# Patient Record
Sex: FEMALE | Race: WHITE | NOT HISPANIC OR LATINO | Employment: FULL TIME | URBAN - NONMETROPOLITAN AREA
[De-identification: names, ages, dates, MRNs, and addresses within clinical notes are randomized per-mention and may not be internally consistent; named-entity substitution may affect disease eponyms.]

---

## 2017-01-12 ENCOUNTER — HOSPITAL ENCOUNTER (EMERGENCY)
Facility: HOSPITAL | Age: 52
Discharge: HOME | End: 2017-01-12
Attending: NURSE PRACTITIONER | Admitting: NURSE PRACTITIONER
Payer: OTHER MISCELLANEOUS

## 2017-01-12 VITALS
RESPIRATION RATE: 20 BRPM | DIASTOLIC BLOOD PRESSURE: 92 MMHG | HEART RATE: 81 BPM | TEMPERATURE: 97.8 F | SYSTOLIC BLOOD PRESSURE: 151 MMHG | OXYGEN SATURATION: 99 %

## 2017-01-12 DIAGNOSIS — M79.645 PAIN OF FINGER OF LEFT HAND: ICD-10-CM

## 2017-01-12 PROCEDURE — 99213 OFFICE O/P EST LOW 20 MIN: CPT

## 2017-01-12 PROCEDURE — 73140 X-RAY EXAM OF FINGER(S): CPT | Mod: TC,LT

## 2017-01-12 PROCEDURE — 99213 OFFICE O/P EST LOW 20 MIN: CPT | Performed by: NURSE PRACTITIONER

## 2017-01-12 ASSESSMENT — ENCOUNTER SYMPTOMS
CHILLS: 0
NAUSEA: 0
FEVER: 0
ACTIVITY CHANGE: 1
FATIGUE: 0
PSYCHIATRIC NEGATIVE: 1
APPETITE CHANGE: 0
DYSURIA: 0
VOMITING: 0

## 2017-01-12 NOTE — ED NOTES
Patient came in with left thumb pain from work related injury by picking up binders. Patient works at [employer]. Patient noted pain & symptoms started on 9/26/16. Pain ranges from excruciating to soreness depending on hand activity.

## 2017-01-12 NOTE — ED AVS SNAPSHOT
[facility]    [address] [address]    Phone:  [phone]                                       Lakeisha Herman   MRN: [id number]    Department:  [facility]   Date of Visit:  1/12/2017           After Visit Summary Signature Page     I have received my discharge instructions, and my questions have been answered. I have discussed any challenges I see with this plan with the nurse or doctor.    ..........................................................................................................................................  Patient/Patient Representative Signature      ..........................................................................................................................................  Patient Representative Print Name and Relationship to Patient    ..................................................               ................................................  Date                                            Time    ..........................................................................................................................................  Reviewed by Signature/Title    ...................................................              ..............................................  Date                                                            Time

## 2017-01-12 NOTE — ED AVS SNAPSHOT
[facility]    [address]    [facility] [address]    Phone:  [phone]                                       Lakeisha Herman   MRN: [id number]    Department:  [facility]   Date of Visit:  1/12/2017           Patient Information     Date Of Birth          1965        Your diagnoses for this visit were:     Pain of finger of left hand        You were seen by Claudia La NP.      Follow-up Information     Follow up with Conchis Elena    Specialty:  Family Practice    Why:  As needed, If symptoms worsen    Contact information:    [facility]  [address] [address]  [phone]          Follow up with [facility].    Specialty:  EMERGENCY MEDICINE    Why:  As needed, If symptoms worsen    Contact information:    [address]  [facility] [address] [phone]    Additional information:    From [place]: Take [address]. Turn left at [address]. Turn left at the first stoplight on [address]. At the first stop sign, take a right onto [address]. Take a left into the parking lot and continue through until you reach the North enterance of the building.       From [place]: Take [address]. Take the [address] ramp towards [place]. Turn left onto [address]. Take a slight right onto [address]. Turn left at the first stoplight on [address]. At the first stop sign, take a right onto [address]. Take a left into the parking lot and continue through until you reach the North enterance of the building.       From [place]: Take [address] South. Take a right at [address]. At the first stop sign, take a right onto [address]. Take a left into the parking lot and continue through until you reach the North enterance of the building.         Discharge Instructions       Take tylenol and or ibuprofen for pain control.  Wear splint when  "your using your hands.   You can use heat and or ice to left thumb. Protect skin from frost and burn.   Follow up with PCP with any increase in symptoms or concerns.   Return to urgent care or emergency department with any increase in symptoms or concerns.     Discharge References/Attachments     STRAINS AND SPRAINS, SELF-CARE FOR (ENGLISH)    STRAINS AND SPRAINS, TREATING (ENGLISH)    R.I.C.E. (ENGLISH)         Review of your medicines      Notice     You have not been prescribed any medications.            Procedures and tests performed during your visit     Fingers XR, 2-3 views, left      Orders Needing Specimen Collection     None      Pending Results     Date and Time Order Name Status Description    2017 1754 Fingers XR, 2-3 views, left In process             Pending Culture Results     No orders found from 2017 to 2017.            Thank you for choosing [facility]       Thank you for choosing [facility] for your care. Our goal is always to provide you with excellent care. Hearing back from our patients is one way we can continue to improve our services. Please take a few minutes to complete the written survey that you may receive in the mail after you visit with us. Thank you!        [employer] Information     [employer] lets you send messages to your doctor, view your test results, renew your prescriptions, schedule appointments and more. To sign up, go to [email or web address] . Click on \"Log in\" on the left side of the screen, which will take you to the Welcome page. Then click on \"Sign up Now\" on the right side of the page.     You will be asked to enter the access code listed below, as well as some personal information. Please follow the directions to create your username and password.     Your access code is: [email or web address]  Expires: 2017  6:23 PM     Your access code will  in 90 days. If you need help or a new code, please call your [facility] clinic or [phone].        Care " EveryWhere ID     This is your Care EveryWhere ID. This could be used by other organizations to access your [facility] medical records  [id number]        After Visit Summary       This is your record. Keep this with you and show to your community pharmacist(s) and doctor(s) at your next visit.

## 2017-01-12 NOTE — ED PROVIDER NOTES
History     Chief Complaint   Patient presents with     Hand Pain     The history is provided by the patient. No  was used.     Lakeisha Herman is a 51 year old female who presents with left thumb pain. No injury or trauma to left thumb. Pain started with lifting binders on 9/26/16 at her job. She works in a business office. She has taken tylenol and ibuprofen with mild relief. Denies numbness or tingling in hand. Denies fever, chills, or night sweats. Eating and drinking well. Bowel and bladder are working well.     I have reviewed the Medications, Allergies, Past Medical and Surgical History, and Social History in the Epic system.    Review of Systems   Constitutional: Positive for activity change. Negative for fever, chills, appetite change and fatigue.   Gastrointestinal: Negative for nausea and vomiting.   Genitourinary: Negative for dysuria.   Musculoskeletal:        Left thumb pain.    Skin: Negative for rash.   Neurological: Negative for numbness.   Psychiatric/Behavioral: Negative.        Physical Exam   BP: 151/92 mmHg  Pulse: 81  Temp: 97.8  F (36.6  C)  Resp: 20  SpO2: 99 %  Physical Exam   Constitutional: She is oriented to person, place, and time. She appears well-developed and well-nourished. No distress.   HENT:   Mouth/Throat: Oropharynx is clear and moist.   Cardiovascular: Normal rate, regular rhythm, normal heart sounds and intact distal pulses.    Pulmonary/Chest: Effort normal. No respiratory distress. She has no wheezes. She has no rales.   Abdominal: Soft.   Musculoskeletal: She exhibits tenderness. She exhibits no edema.   ROM and CMS intact to left thumb. Left radial pulse +2. Negative Tinel and Phalen sign. No redness or increase in warmth to the touch to left thumb.    Neurological: She is alert and oriented to person, place, and time.   Skin: Skin is warm and dry. No rash noted. She is not diaphoretic. No erythema.   Psychiatric: She has a normal mood and affect. Her  behavior is normal.   Nursing note and vitals reviewed.      ED Course   Procedures  I personally reviewed the x-rays and there is NO fracture or dislocation. Radiology review pending and nurse will notify patient if there is any change in the treatment plan.    Aluminium padded splint secured to left thumb. She will wear splint when using left hand.     Assessments & Plan (with Medical Decision Making)     I have reviewed the nursing notes.    I have reviewed the findings, diagnosis, plan and need for follow up with the patient.  Discharged in stable condition.     New Prescriptions    No medications on file       Final diagnoses:   Pain of finger of left hand     Take tylenol and or ibuprofen for pain control.  Wear splint when your using your thumb.   You can use heat and or ice to left thumb. Protect skin from frost and burn.   Follow up with PCP with any increase in symptoms or concerns.   Return to urgent care or emergency department with any increase in symptoms or concerns.     LANE Weber  1/12/2017  5:59 PM  URGENT CARE CLINIC          Claudia La NP  01/16/17 0928

## 2017-01-13 ENCOUNTER — HOSPITAL ENCOUNTER (EMERGENCY)
Facility: HOSPITAL | Age: 52
Discharge: HOME | End: 2017-01-13
Attending: NURSE PRACTITIONER | Admitting: NURSE PRACTITIONER
Payer: OTHER MISCELLANEOUS

## 2017-01-13 VITALS
DIASTOLIC BLOOD PRESSURE: 96 MMHG | HEART RATE: 79 BPM | TEMPERATURE: 98.4 F | OXYGEN SATURATION: 99 % | SYSTOLIC BLOOD PRESSURE: 146 MMHG | RESPIRATION RATE: 16 BRPM
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
## 2017-01-13 PROCEDURE — 99213 OFFICE O/P EST LOW 20 MIN: CPT | Performed by: NURSE PRACTITIONER

## 2017-01-13 PROCEDURE — 99212 OFFICE O/P EST SF 10 MIN: CPT

## 2017-01-13 ASSESSMENT — ENCOUNTER SYMPTOMS: CONSTITUTIONAL NEGATIVE: 1

## 2017-01-13 NOTE — ED AVS SNAPSHOT
[facility]    [address] [address]    Phone:  [phone]                                       Lakeisha Herman   MRN: [id number]    Department[facility]  [facility]   Date of Visit:  1/13/2017           Patient Information     Date Of Birth          1965        Your diagnoses for this visit were:     Overuse syndrome Left Thumb       You were seen by Justa Diaz, NP.      Follow-up Information     Follow up with Conchis Elena    Specialty:  Family Practice    Why:  for re-evaluation and ongoing management     Contact information:    [facility]  [address] [address]  [phone]        Discharge References/Attachments     MSDS, REDUCING RISKS: YOUR ROLE (ENGLISH)      ED Discharge Orders     PHYSICAL THERAPY REFERRAL       *This therapy referral will be filtered to a centralized scheduling office at [facility] and the patient will receive a call to schedule an appointment at a [place] location most convenient for them. *     [facility] provides Physical Therapy evaluation and treatment and many specialty services across the [place] system.  If requesting a specialty program, please choose from the list below.    If you have not heard from the scheduling office within 2 business days, please call [phone] for all locations, with the exception of [place], please call [phone].  Treatment: Evaluation & Treatment   Please be aware that coverage of these services is subject to the terms and limitations of your health insurance plan.  Call member services at your health plan with any benefit or coverage questions.      **Note to Provider:  If you are referring outside of [place] for the therapy appointment, please list the name of the location in the  special instructions  above, print the referral and give to the patient to schedule the appointment.                     Review  "of your medicines      Notice     You have not been prescribed any medications.            Orders Needing Specimen Collection     None      Pending Results     No orders found from 2017 to 2017.            Pending Culture Results     No orders found from 2017 to 2017.            Thank you for choosing [facility]       Thank you for choosing [facility] for your care. Our goal is always to provide you with excellent care. Hearing back from our patients is one way we can continue to improve our services. Please take a few minutes to complete the written survey that you may receive in the mail after you visit with us. Thank you!        [employer] Information     [employer] lets you send messages to your doctor, view your test results, renew your prescriptions, schedule appointments and more. To sign up, go to [email or web address] . Click on \"Log in\" on the left side of the screen, which will take you to the Welcome page. Then click on \"Sign up Now\" on the right side of the page.     You will be asked to enter the access code listed below, as well as some personal information. Please follow the directions to create your username and password.     Your access code is: [email or web address]  Expires: 2017  6:23 PM     Your access code will  in 90 days. If you need help or a new code, please call your [facility] clinic or [phone].        Care EveryWhere ID     This is your Care EveryWhere ID. This could be used by other organizations to access your [facility] medical records  [id number]        After Visit Summary       This is your record. Keep this with you and show to your community pharmacist(s) and doctor(s) at your next visit.                  "

## 2017-01-13 NOTE — PROGRESS NOTES
Quick Note:    Left First Digit XR report faxed to PCP, Dr. Conchis Elena at [facility]. Impression - Minimal degenerative changed at the first carpometacarpal joint are noted. Pt advised to follow up.  ______

## 2017-01-13 NOTE — ED NOTES
Pt presents with c/o thumb pain. Pt was seen here last night and wants a new brace/splint for he thumb.

## 2017-01-13 NOTE — ED NOTES
Patient came in with left thumb discomfort. Patient came in yesterday & splint was applied. But patient says its too bulky to work with. Pain 7/10 when using.

## 2017-01-13 NOTE — ED AVS SNAPSHOT
[facility]    [address] [address]    Phone:  [phone]                                       Lakeisha Herman   MRN: [id number]    Department[facility]  [facility]   Date of Visit:  1/13/2017           After Visit Summary Signature Page     I have received my discharge instructions, and my questions have been answered. I have discussed any challenges I see with this plan with the nurse or doctor.    ..........................................................................................................................................  Patient/Patient Representative Signature      ..........................................................................................................................................  Patient Representative Print Name and Relationship to Patient    ..................................................               ................................................  Date                                            Time    ..........................................................................................................................................  Reviewed by Signature/Title    ...................................................              ..............................................  Date                                                            Time

## 2017-01-16 ASSESSMENT — ENCOUNTER SYMPTOMS: NUMBNESS: 0

## 2017-01-16 NOTE — DISCHARGE INSTRUCTIONS
Take tylenol and or ibuprofen for pain control.  Wear splint when your using your thumb.   You can use heat and or ice to left thumb. Protect skin from frost and burn.   Follow up with PCP with any increase in symptoms or concerns.   Return to urgent care or emergency department with any increase in symptoms or concerns.

## 2017-01-23 ENCOUNTER — HOSPITAL ENCOUNTER (OUTPATIENT)
Dept: OCCUPATIONAL THERAPY | Facility: HOSPITAL | Age: 52
Setting detail: THERAPIES SERIES
End: 2017-01-23
Attending: NURSE PRACTITIONER
Payer: OTHER MISCELLANEOUS
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
## 2017-01-23 PROCEDURE — 40000118 ZZH STATISTIC OT DEPT VISIT

## 2017-01-23 PROCEDURE — 97165 OT EVAL LOW COMPLEX 30 MIN: CPT | Mod: GO

## 2017-01-23 PROCEDURE — 97166 OT EVAL MOD COMPLEX 45 MIN: CPT | Mod: GO

## 2017-01-24 NOTE — PROGRESS NOTES
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  This past fall she began experiencing pain in the thenar eminance of her left thumb which she attributes to  lifting large binders repeateldly from a surface above shoulder height.  She has notice when she tried to  item such as a bunch of bananas at the groucer store, or a pan while preparing meals she will have sharp pain.  Lakeisha is , lives alone, frequently spends time with her grandson and enjoys working in her yard.   Previous treatment or current condition aluminum and foam finger splint which was bulky and did not allow her to do her work.   Past medical history n/a   How/Where did it occur With repetition/overuse   Onset date of current episode/exacerbation 09/24/16   Chronicity New   Hand Dominance Right   Affected side Left   Functional limitations perform required work activities;perform activities of daily living;perform desired leisure / sports activities   Reported Symptoms Pain;Loss of strength   QuickDASH [Functional Disability Questionnaire; 0-100 (0=no dysfunction; 100=dysfunction)] Open Dash   Open Jar 5   Heavy Household Chores 3   Carry a shopping bag 3  (not including thum)   Wash back 4   Cut food with knife 2   Recreational activities 2   Social activities 1   Work, daily activities 4   Pain 3   Tingling 1   Sleeping 2   QuickDASH Sum 30   QuickDASH Count 11   QuickDASH Disability/Symptom Score 43.18   Prior level of function Independent ADL;Independent IADL   Important Activities being outside, shoveling, work, playing with grandson, doing activities with son   Living environment [place]   Patient role/Employment history Employed  "  Occupation accounts payable and elementary enrollment    Employment Status Working in normal job without restrictions   Primary Job Tasks Keyboarding;Gripping/pinching;Pushing/pulling;Prolonged sitting;Repetitive tasks;Carrying   Occupation Comments Pt has modified her work environment somewhat in that she's decreased the size of the binders she has to lift frequently and put them lower to make them more accessible.   Patient/Family goals statement \"I'm hoping to get back to using hand without pain\"   Pain   Pain Primary Pain Report   Primary Pain Report   Location volar surface of thenar eminance   Radiation (radial forearm)   Pain Quality Sharp   Frequency Intermittent   Scale 4/10  (can shoot up to 6-7/10)   Pain Is Worse In The P.m.   Pain Is Exacerbated By Gripping;Pinching   Pain Is Relieved By Ice  (massage)   Progression Since Onset Unchanged  (provactive testing negative)   Edema   Edema Thumb;Distal Wrist Crease   Thumb (measured in cm)   P1 -  - Left 6.5   P1 -  - Right 6.6   Distal Wrist Crease (measured in cm)   Distal Wrist Crease - - Left 17.1   Distal Wrist Crease - - Right 16.5   ROM   ROM AROM   AROM   AROM Thumb   Thumb   MP Flexion - Left 58   MP Flexion - Right 58   IP Flexion - Left 64   IP Flexion - Right 68   RABD - Left 65   RABD - Right 83   PABD - Left 57   PABD - Right 91   Opposition to small left (cm) complete but painful   Opposition to small right (cm) complete   Strength   Strength Strength    Avg - Left 12    Avg - Right 55   Lateral Pinch - Left 10   Lateral Pinch - Right 14   3 Point Pinch - Left 6   3 Point Pinch - Right 12   Therapy Interventions   Planned Therapy Interventions Ultrasound;Paraffin;ROM;Strengthening;Splinting;Edema Management   Clinical Impression   Criteria for Skilled Therapeutic Interventions Met yes;treatment indicated   OT Diagnosis Over use   Influenced by the following impairments Pain;Decreased range of motion;Decreased strength "   Assessment of Occupational Performance 3-5 Performance Deficits   Identified Performance Deficits work, cooking, shopping, playing with her grandson, yardwork, gripping, pinching   Clinical Decision Making (Complexity) Moderate complexity   Therapy Frequency 2x/week   Predicted Duration of Therapy Intervention (days/wks) 4 weeks   Risks and Benefits of Treatment have been explained. Yes   Patient, Family & other staff in agreement with plan of care Yes   Hand Goals   Hand Goals Work;Household Chores;Sports/Recreation   Household Chores   Current Functional Task Cooking   Previous Performance Level Independent   Current Performance Level Moderate difficulty   Goal Target Task Hold and lift pan   Goal Target Performance Level Pain free   Due Date 02/20/17   Work   Current Functional Task Repetitive tasks   Previous Performance Level Independent   Current Performance Level Mild difficulty   Goal Target Task Complete repetitive tasks   Goal Target Performance Level Pain free   Due Date 02/20/17   Sports/Recreation   Current Functional Task Playing   Previous Performance Level Independent   Curent Performance Level Moderate difficulty   Goal Target Task (play with grandson)   Goal Target Performance Level Pain free   Due Date 02/20/17   Total Evaluation Time   Total Evaluation Time (Minutes) 34

## 2017-01-25 ENCOUNTER — HOSPITAL ENCOUNTER (OUTPATIENT)
Dept: OCCUPATIONAL THERAPY | Facility: HOSPITAL | Age: 52
Setting detail: THERAPIES SERIES
End: 2017-01-25
Attending: FAMILY MEDICINE
Payer: OTHER MISCELLANEOUS

## 2017-01-25 PROCEDURE — 97760 ORTHOTIC MGMT&TRAING 1ST ENC: CPT | Mod: GO

## 2017-01-25 PROCEDURE — 40000118 ZZH STATISTIC OT DEPT VISIT

## 2017-01-25 PROCEDURE — 97018 PARAFFIN BATH THERAPY: CPT | Mod: GO

## 2017-02-02 ENCOUNTER — HOSPITAL ENCOUNTER (OUTPATIENT)
Dept: OCCUPATIONAL THERAPY | Facility: HOSPITAL | Age: 52
Setting detail: THERAPIES SERIES
End: 2017-02-02
Attending: FAMILY MEDICINE
Payer: OTHER MISCELLANEOUS

## 2017-02-02 PROCEDURE — 97140 MANUAL THERAPY 1/> REGIONS: CPT | Mod: GO

## 2017-02-02 PROCEDURE — 97110 THERAPEUTIC EXERCISES: CPT | Mod: GO

## 2017-02-02 PROCEDURE — 40000118 ZZH STATISTIC OT DEPT VISIT

## 2017-02-02 PROCEDURE — 97018 PARAFFIN BATH THERAPY: CPT | Mod: GO

## 2017-02-09 ENCOUNTER — HOSPITAL ENCOUNTER (OUTPATIENT)
Dept: OCCUPATIONAL THERAPY | Facility: HOSPITAL | Age: 52
Setting detail: THERAPIES SERIES
End: 2017-02-09
Attending: FAMILY MEDICINE
Payer: OTHER MISCELLANEOUS

## 2017-02-09 PROCEDURE — 40000118 ZZH STATISTIC OT DEPT VISIT

## 2017-02-09 PROCEDURE — 97035 APP MDLTY 1+ULTRASOUND EA 15: CPT | Mod: GO

## 2017-02-09 PROCEDURE — 97140 MANUAL THERAPY 1/> REGIONS: CPT | Mod: GO

## 2017-02-13 ENCOUNTER — HOSPITAL ENCOUNTER (OUTPATIENT)
Dept: OCCUPATIONAL THERAPY | Facility: HOSPITAL | Age: 52
Setting detail: THERAPIES SERIES
End: 2017-02-13
Attending: NURSE PRACTITIONER
Payer: OTHER MISCELLANEOUS

## 2017-02-13 PROCEDURE — 97035 APP MDLTY 1+ULTRASOUND EA 15: CPT | Mod: GO

## 2017-02-13 PROCEDURE — 40000118 ZZH STATISTIC OT DEPT VISIT

## 2017-02-13 PROCEDURE — 97140 MANUAL THERAPY 1/> REGIONS: CPT | Mod: GO

## 2017-02-24 ENCOUNTER — HOSPITAL ENCOUNTER (OUTPATIENT)
Dept: OCCUPATIONAL THERAPY | Facility: HOSPITAL | Age: 52
Setting detail: THERAPIES SERIES
End: 2017-02-24
Attending: NURSE PRACTITIONER
Payer: OTHER MISCELLANEOUS

## 2017-02-24 PROCEDURE — 40000118 ZZH STATISTIC OT DEPT VISIT

## 2017-02-24 PROCEDURE — 97140 MANUAL THERAPY 1/> REGIONS: CPT | Mod: GO

## 2017-02-24 PROCEDURE — 97018 PARAFFIN BATH THERAPY: CPT | Mod: GO

## 2017-02-24 PROCEDURE — 97035 APP MDLTY 1+ULTRASOUND EA 15: CPT | Mod: GO

## 2017-03-06 ENCOUNTER — HOSPITAL ENCOUNTER (OUTPATIENT)
Dept: OCCUPATIONAL THERAPY | Facility: HOSPITAL | Age: 52
Setting detail: THERAPIES SERIES
End: 2017-03-06
Attending: NURSE PRACTITIONER
Payer: OTHER MISCELLANEOUS

## 2017-03-06 PROCEDURE — 97035 APP MDLTY 1+ULTRASOUND EA 15: CPT | Mod: GO

## 2017-03-06 PROCEDURE — 40000118 ZZH STATISTIC OT DEPT VISIT

## 2017-03-06 PROCEDURE — 97018 PARAFFIN BATH THERAPY: CPT | Mod: GO

## 2017-04-25 NOTE — PROGRESS NOTES
Outpatient Occupational Therapy Discharge Note     Patient: Lakeisha Herman  : 1965  Insurance:   Payor/Plan Subscriber Name Rel Member # Group #   WORK COMP - [place] * [id number]  [id number]        [id number]       Beginning/End Dates of Reporting Period:  2017 to 3/6/17    Referring Provider: Justa Diaz NP    Therapy Diagnosis: overuse of L thumb    Client Self Report:   Pt rated pain 2/10 at her last appointment on 3/6/2017.  Pt did schedule her final appointment as planned.  Her current status is unknown.    Objective Measurements:  Pt was not remeasured.    Outcome Measures (most recent score):      Goals:  Hand Goals Work;Household Chores;Sports/Recreation   Household Chores   Current Functional Task Cooking   Previous Performance Level Independent   Current Performance Level Moderate difficulty   Goal Target Task Hold and lift pan   Goal Target Performance Level Pain free   Due Date 17   Work   Current Functional Task Repetitive tasks   Previous Performance Level Independent   Current Performance Level Mild difficulty   Goal Target Task Complete repetitive tasks   Goal Target Performance Level Pain free   Due Date 17   Sports/Recreation   Current Functional Task Playing   Previous Performance Level Independent   Curent Performance Level Moderate difficulty   Goal Target Task (play with grandson)   Goal Target Performance Level Pain free   Due Date 17            Plan:  Discharge from therapy.    Discharge:    Reason for Discharge: Patient has failed to schedule further appointments.    Equipment Issued:     Discharge Plan: current status is unknown

## 2018-08-02 ENCOUNTER — HOSPITAL ENCOUNTER (EMERGENCY)
Facility: HOSPITAL | Age: 53
Discharge: HOME | End: 2018-08-02
Attending: PHYSICIAN ASSISTANT | Admitting: PHYSICIAN ASSISTANT
Payer: COMMERCIAL

## 2018-08-02 VITALS
OXYGEN SATURATION: 99 % | DIASTOLIC BLOOD PRESSURE: 89 MMHG | HEART RATE: 83 BPM | SYSTOLIC BLOOD PRESSURE: 144 MMHG | TEMPERATURE: 97.7 F | RESPIRATION RATE: 16 BRPM

## 2018-08-02 DIAGNOSIS — J01.10 ACUTE FRONTAL SINUSITIS, RECURRENCE NOT SPECIFIED: ICD-10-CM

## 2018-08-02 PROCEDURE — G0463 HOSPITAL OUTPT CLINIC VISIT: HCPCS

## 2018-08-02 PROCEDURE — 99213 OFFICE O/P EST LOW 20 MIN: CPT | Performed by: PHYSICIAN ASSISTANT

## 2018-08-02 RX ORDER — CLINDAMYCIN HCL 300 MG
300 CAPSULE ORAL 3 TIMES DAILY
Qty: 30 CAPSULE | Refills: 0 | Status: SHIPPED | OUTPATIENT
Start: 2018-08-02 | End: 2021-01-12

## 2018-08-02 RX ORDER — CLOTRIMAZOLE 1 %
1 CREAM WITH APPLICATOR VAGINAL DAILY
Qty: 45 G | Refills: 0 | Status: SHIPPED | OUTPATIENT
Start: 2018-08-02 | End: 2021-01-12

## 2018-08-02 ASSESSMENT — ENCOUNTER SYMPTOMS
COUGH: 0
EYE DISCHARGE: 0
LIGHT-HEADEDNESS: 0
FATIGUE: 0
SORE THROAT: 0
FEVER: 0
PSYCHIATRIC NEGATIVE: 1
EYE REDNESS: 0
VOMITING: 0
NECK PAIN: 0
SINUS PRESSURE: 1
SINUS PAIN: 1
NAUSEA: 0
CARDIOVASCULAR NEGATIVE: 1
ABDOMINAL PAIN: 0
DIZZINESS: 0
APPETITE CHANGE: 0
DIARRHEA: 0
VOICE CHANGE: 0
NECK STIFFNESS: 0
TROUBLE SWALLOWING: 0
HEADACHES: 0

## 2018-08-02 NOTE — ED TRIAGE NOTES
Pt presents today with c/o nasal irration with headache started yesterday, cough a little bit and feeling of needing to clear throat. 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 Rate pains at 6.

## 2018-08-02 NOTE — LETTER
[facility]  [address] [address]  Phone: [phone]    August 2, 2018        Lakeisha Herman  [facility] [place] [address]          To whom it may concern:    RE: Lakeisha Herman    Patient was seen and treated today at our clinic.    Please, excuse her from work on 03 August 2018, due to illness.      Sincerely,        Leatha Roper Certified  Physician Assistant  8/2/2018  4:54 PM  URGENT CARE CLINIC

## 2018-08-02 NOTE — ED AVS SNAPSHOT
[facility]    [address] [address]    Phone:  [phone]                                       Lakeisha Herman   MRN: [id number]    Department:  [facility]   Date of Visit:  8/2/2018           Patient Information     Date Of Birth          1965        Your diagnoses for this visit were:     Acute frontal sinusitis, recurrence not specified        You were seen by Leatha Roper PA.      Follow-up Information     Follow up with Conchis Elena    Specialty:  Family Practice    Why:  If symptoms worsen    Contact information:    [facility]  [address] [facility] [address] [phone]          Follow up with [facility].    Specialty:  EMERGENCY MEDICINE    Why:  If further concerns develop    Contact information:    [address] [address] [phone]    Additional information:    From [facility]: Take [address]. Turn left at [address]. Turn left at the first stoplight on [address]. At the first stop sign, take a right onto [address]. Take a left into the parking lot and continue through until you reach the North enterance of the building.       From [place]: Take [address]. Take the [address] ramp towards [place]. Turn left onto [address]. Take a slight right onto [address]. Turn left at the first stoplight on [address]. At the first stop sign, take a right onto [address]. Take a left into the parking lot and continue through until you reach the North enterance of the building.       From [place]: Take [address]. Take a right at [address]. At the first stop sign, take a right onto [address]. Take a left into the parking lot and continue through until you reach the North enterance of the building.       Discharge References/Attachments     SINUSITIS (ANTIBIOTIC TREATMENT) (ENGLISH)         Review  "of your medicines      START taking        Dose / Directions Last dose taken    clindamycin 300 MG capsule   Commonly known as:  CLEOCIN   Dose:  300 mg   Quantity:  30 capsule        Take 1 capsule (300 mg) by mouth 3 times daily for 10 days   Refills:  0                Prescriptions were sent or printed at these locations (1 Prescription)                   [facility] [address] [address]    Telephone:  [phone]   Fax:  [phone]   Hours:                  E-Prescribed (1 of 1)         clindamycin (CLEOCIN) 300 MG capsule                Orders Needing Specimen Collection     None      Pending Results     No orders found from 2018 to 8/3/2018.            Pending Culture Results     No orders found from 2018 to 8/3/2018.            Thank you for choosing [facility]       Thank you for choosing [facility] for your care. Our goal is always to provide you with excellent care. Hearing back from our patients is one way we can continue to improve our services. Please take a few minutes to complete the written survey that you may receive in the mail after you visit with us. Thank you!        iiko Information     iiko lets you send messages to your doctor, view your test results, renew your prescriptions, schedule appointments and more. To sign up, go to [email or web address] . Click on \"Log in\" on the left side of the screen, which will take you to the Welcome page. Then click on \"Sign up Now\" on the right side of the page.     You will be asked to enter the access code listed below, as well as some personal information. Please follow the directions to create your username and password.     Your access code is: [email or web address]  Expires: 10/31/2018  4:45 PM     Your access code will  in 90 days. If you need help or a new code, please call your [facility] clinic or [phone].        Care EveryWhere ID     This is your Care EveryWhere ID. This could " be used by other organizations to access your [place] medical records  [id number]        Equal Access to Services     TEVIN SEALS : Hadii kiley Martin, alvarez davenport, monica ragsdale. So [facility] [phone].    ATENCIÓN: Si habla español, tiene a banks disposición servicios gratuitos de asistencia lingüística. Llame al [phone].    We comply with applicable federal civil rights laws and [place] laws. We do not discriminate on the basis of race, color, national origin, age, disability, sex, sexual orientation, or gender identity.            After Visit Summary       This is your record. Keep this with you and show to your community pharmacist(s) and doctor(s) at your next visit.

## 2018-08-02 NOTE — ED AVS SNAPSHOT
[facility]    [address] [address]    Phone:  [phone]                                       Lakeisha Herman   MRN: [id number]    Department:  [facility]   Date of Visit:  8/2/2018           After Visit Summary Signature Page     I have received my discharge instructions, and my questions have been answered. I have discussed any challenges I see with this plan with the nurse or doctor.    ..........................................................................................................................................  Patient/Patient Representative Signature      ..........................................................................................................................................  Patient Representative Print Name and Relationship to Patient    ..................................................               ................................................  Date                                            Time    ..........................................................................................................................................  Reviewed by Signature/Title    ...................................................              ..............................................  Date                                                            Time

## 2018-08-02 NOTE — ED PROVIDER NOTES
History     Chief Complaint   Patient presents with     Sinusitis     The history is provided by the patient. No  was used.     Lakeisha Herman is a 53 year old female who has 2 days of frontal sinus pain/pressure, with decreased energy. Pt concerned that the chemicals being used at her work are contributing to her sinus irritation. They are stripping the wax floors. Has no sore throat. No ear pain. No rash. No change in b/b habits        PMH: not pertinent  PSH: not pertinent  FHX: not pertinent    Social History:  Marital Status:   [4]  Social History   Substance Use Topics     Smoking status: Never Smoker     Smokeless tobacco: Never Used     Alcohol use Not on file        Medications:      clindamycin (CLEOCIN) 300 MG capsule   clotrimazole (LOTRIMIN) 1 % cream         Review of Systems   Constitutional: Negative for appetite change, fatigue and fever.   HENT: Positive for congestion, sinus pain and sinus pressure. Negative for ear pain, sore throat, trouble swallowing and voice change.    Eyes: Negative for discharge and redness.   Respiratory: Negative for cough.    Cardiovascular: Negative.    Gastrointestinal: Negative for abdominal pain, diarrhea, nausea and vomiting.   Genitourinary: Negative.    Musculoskeletal: Negative for neck pain and neck stiffness.   Skin: Negative for rash.   Neurological: Negative for dizziness, light-headedness and headaches.   Psychiatric/Behavioral: Negative.        Physical Exam   BP: 144/89  Pulse: 83  Temp: 97.7  F (36.5  C)  Resp: 16  SpO2: 99 %      Physical Exam   Constitutional: She is oriented to person, place, and time. She appears well-developed and well-nourished. No distress.   HENT:   Head: Normocephalic and atraumatic.   Right Ear: External ear normal.   Left Ear: External ear normal.   Mouth/Throat: Oropharynx is clear and moist.   Bilateral TMs/canals clear/wnl  Frontal sinus TTP     Eyes: Conjunctivae and EOM are normal. Right eye  exhibits no discharge. Left eye exhibits no discharge.   Neck: Normal range of motion. Neck supple.   Cardiovascular: Normal rate, regular rhythm and normal heart sounds.    Pulmonary/Chest: Effort normal and breath sounds normal. No respiratory distress.   Abdominal: Soft. Bowel sounds are normal. She exhibits no distension. There is no tenderness.   Neurological: She is alert and oriented to person, place, and time.   Skin: Skin is warm and dry. No rash noted. She is not diaphoretic.   Psychiatric: She has a normal mood and affect.   Nursing note and vitals reviewed.      ED Course     ED Course     Procedures            No results found for this or any previous visit (from the past 24 hour(s)).    Medications - No data to display    Assessments & Plan (with Medical Decision Making)     I have reviewed the nursing notes.    I have reviewed the findings, diagnosis, plan and need for follow up with the patient.      Discharge Medication List as of 8/2/2018  4:46 PM      START taking these medications    Details   clindamycin (CLEOCIN) 300 MG capsule Take 1 capsule (300 mg) by mouth 3 times daily for 10 days, Disp-30 capsule, R-0, E-Prescribe             Final diagnoses:   Acute frontal sinusitis, recurrence not specified       8/2/2018   [facility]     Leatha Roper PA  08/02/18 4857

## 2018-11-15 ENCOUNTER — OFFICE VISIT (OUTPATIENT)
Dept: CHIROPRACTIC MEDICINE | Facility: OTHER | Age: 53
End: 2018-11-15
Attending: CHIROPRACTOR
Payer: COMMERCIAL

## 2018-11-15 DIAGNOSIS — M99.01 SEGMENTAL AND SOMATIC DYSFUNCTION OF CERVICAL REGION: Primary | ICD-10-CM

## 2018-11-15 DIAGNOSIS — M54.2 CERVICALGIA: ICD-10-CM

## 2018-11-15 DIAGNOSIS — M99.02 SEGMENTAL AND SOMATIC DYSFUNCTION OF THORACIC REGION: ICD-10-CM

## 2018-11-15 PROCEDURE — 98940 CHIROPRACT MANJ 1-2 REGIONS: CPT | Mod: AT | Performed by: CHIROPRACTOR

## 2018-11-15 PROCEDURE — 99202 OFFICE O/P NEW SF 15 MIN: CPT | Mod: 25 | Performed by: CHIROPRACTOR

## 2018-11-15 NOTE — MR AVS SNAPSHOT
"              After Visit Summary   11/15/2018    Lakeisha Herman    MRN: [id number]           Patient Information     Date Of Birth          1965        Visit Information        Provider Department      11/15/2018 4:30 PM [facility]        Today's Diagnoses     Segmental and somatic dysfunction of cervical region    -  1    Cervicalgia        Segmental and somatic dysfunction of thoracic region           Follow-ups after your visit        Your next 10 appointments already scheduled     Nov 26, 2018  4:10 PM CST   [facility] ([facility]    [address]  [facility] [address]   [phone]              Who to contact     If you have questions or need follow up information about today's clinic visit or your schedule please contact  [facility] [place] [facility] directly at [phone].  Normal or non-critical lab and imaging results will be communicated to you by InnoVital Systemshart, letter or phone within 4 business days after the clinic has received the results. If you do not hear from us within 7 days, please contact the clinic through MyChart or phone. If you have a critical or abnormal lab result, we will notify you by phone as soon as possible.  Submit refill requests through orderbird AG or call your pharmacy and they will forward the refill request to us. Please allow 3 business days for your refill to be completed.          Additional Information About Your Visit        MyChart Information     orderbird AG lets you send messages to your doctor, view your test results, renew your prescriptions, schedule appointments and more. To sign up, go to [email or web address] . Click on \"Log in\" on the left side of the screen, which will take you to the Welcome page. Then click on \"Sign up Now\" on the right side of the page.     You will be asked to enter the access code listed below, as well as some personal information. Please follow the " directions to create your username and password.     Your access code is: [email or web address]  Expires: 2019  8:34 AM     Your access code will  in 90 days. If you need help or a new code, please call your [place] clinic or [phone].        Care EveryWhere ID     This is your Care EveryWhere ID. This could be used by other organizations to access your [place] medical records  [id number]         Blood Pressure from Last 3 Encounters:   18 144/89   17 146/96   17 151/92    Weight from Last 3 Encounters:   No data found for Wt              We Performed the Following     CHIROPRAC MANIP,SPINAL,1-2 REGIONS        Primary Care Provider Office Phone # Fax #    Conchis L Ulices [phone]       [facility] [address] [facility] [address]        Equal Access to Services     TEVIN SEALS : Hadii aad ku hadasho Somaura, waaxda luqadaha, qaybta kaalmada adeegyada, waxay flyin germania llamas . So [facility] [phone].    ATENCIÓN: Si habla español, tiene a banks disposición servicios gratuitos de asistencia lingüística. Otoniel al [phone].    We comply with applicable federal civil rights laws and [place] laws. We do not discriminate on the basis of race, color, national origin, age, disability, sex, sexual orientation, or gender identity.            Thank you!     Thank you for choosing  [facility]""[facility] [place]  for your care. Our goal is always to provide you with excellent care. Hearing back from our patients is one way we can continue to improve our services. Please take a few minutes to complete the written survey that you may receive in the mail after your visit with us. Thank you!             Your Updated Medication List - Protect others around you: Learn how to safely use, store and throw away your medicines at www.disposemymeds.org.      Notice  As of 11/15/2018 11:59 PM    You have not been prescribed any medications.

## 2018-11-19 ENCOUNTER — OFFICE VISIT (OUTPATIENT)
Dept: CHIROPRACTIC MEDICINE | Facility: OTHER | Age: 53
End: 2018-11-19
Attending: CHIROPRACTOR
Payer: COMMERCIAL

## 2018-11-19 DIAGNOSIS — M99.02 SEGMENTAL AND SOMATIC DYSFUNCTION OF THORACIC REGION: ICD-10-CM

## 2018-11-19 DIAGNOSIS — M99.01 SEGMENTAL AND SOMATIC DYSFUNCTION OF CERVICAL REGION: Primary | ICD-10-CM

## 2018-11-19 DIAGNOSIS — M54.2 CERVICALGIA: ICD-10-CM

## 2018-11-19 PROCEDURE — 98940 CHIROPRACT MANJ 1-2 REGIONS: CPT | Mod: AT | Performed by: CHIROPRACTOR

## 2018-11-19 NOTE — MR AVS SNAPSHOT
"              After Visit Summary   11/19/2018    Lakeisha Herman    MRN: [id number]           Patient Information     Date Of Birth          1965        Visit Information        Provider Department      11/19/2018 4:10 PM [facility]        Today's Diagnoses     Segmental and somatic dysfunction of cervical region    -  1    Cervicalgia        Segmental and somatic dysfunction of thoracic region           Follow-ups after your visit        Your next 10 appointments already scheduled     Nov 26, 2018  4:10 PM CST   [facility]    [address]  [facility] [address]   [phone]              Who to contact     If you have questions or need follow up information about today's clinic visit or your schedule please contact  [facility] directly at [phone].  Normal or non-critical lab and imaging results will be communicated to you by Snoballhart, letter or phone within 4 business days after the clinic has received the results. If you do not hear from us within 7 days, please contact the clinic through MyChart or phone. If you have a critical or abnormal lab result, we will notify you by phone as soon as possible.  Submit refill requests through ScriptRock or call your pharmacy and they will forward the refill request to us. Please allow 3 business days for your refill to be completed.          Additional Information About Your Visit        MyChart Information     ScriptRock lets you send messages to your doctor, view your test results, renew your prescriptions, schedule appointments and more. To sign up, go to [email or web address] . Click on \"Log in\" on the left side of the screen, which will take you to the Welcome page. Then click on \"Sign up Now\" on the right side of the page.     You will be asked to enter the access code listed below, as well as some personal information. Please follow the " directions to create your username and password.     Your access code is: [email or web address]  Expires: 2019  8:34 AM     Your access code will  in 90 days. If you need help or a new code, please call your [place] clinic or [phone].        Care EveryWhere ID     This is your Care EveryWhere ID. This could be used by other organizations to access your [place] medical records  [id number]         Blood Pressure from Last 3 Encounters:   18 144/89   17 146/96   17 151/92    Weight from Last 3 Encounters:   No data found for Wt              We Performed the Following     CHIROPRAC MANIP,SPINAL,1-2 REGIONS        Primary Care Provider Office Phone # Fax #    Conchis L Ulices [phone]       [facility] [address] [facility] [address]        Equal Access to Services     TEVIN SEALS : Hadii aad ku hadasho Somaura, waaxda luqadaha, qaybta kaalmada adeegyada, waxay flyin germania llamas . So [facility] [phone].    ATENCIÓN: Si habla español, tiene a banks disposición servicios gratuitos de asistencia lingüística. Otoniel al [phone].    We comply with applicable federal civil rights laws and [place] laws. We do not discriminate on the basis of race, color, national origin, age, disability, sex, sexual orientation, or gender identity.            Thank you!     [facility] you [facility]  [facility] [place]  [facility] your care. Our goal is always to provide you with excellent care. Hearing back from our patients is one way we can continue to improve our services. Please take a few minutes to complete the written survey that you may receive in the mail after your visit with us. Thank you!             Your Updated Medication List - Protect others around you: Learn how to safely use, store and throw away your medicines at www.disposemymeds.org.      Notice  As of 2018 11:59 PM    You have not been prescribed any medications.

## 2018-11-20 NOTE — PROGRESS NOTES
Subjective Finding:    Chief compalint: Patient presents with:  Neck Pain: sharp neck pain with movement  , Pain Scale: 6/10, Intensity: sharp, Duration: 3 weeks, Radiating: no.    Date of injury:     Activities that the pain restricts:   Home/household/hobbies/social activities: yes.  Work duties: yes.  Sleep: yes.  Makes symptoms better: rest.  Makes symptoms worse: activity, cervical extension and cervical flexion.  Have you seen anyone else for the symptoms? No.  Work related: no.  Automobile related injury: no.    Objective and Assessment:    Posture Analysis:   High shoulder: .  Head tilt: .  High iliac crest: .  Head carriage: forward.  Thoracic Kyphosis: neutral.  Lumbar Lordosis: neutral.    Lumbar Range of Motion: .  Cervical Range of Motion: extension decreased, left lateral flexion decreased and right lateral flexion decreased.  Thoracic Range of Motion: .  Extremity Range of Motion: .    Palpation:   Lev scapulae: stiff, referred pain: no  Traps: sharp pain, referred pain: no    Segmental dysfunction pre-treatment and treatment area: C2, C3, C7 and T3.    Assessment post-treatment:  Cervical: ROM increased.  Thoracic: ROM increased.  Lumbar: .    Comments: .      Complicating Factors: .    Procedure(s):  CMT:  78236 Chiropractic manipulative treatment 1-2 regions performed   Cervical: Diversified, See above for level, Supine and Thoracic: Diversified, See above for level, Prone    Modalities:  None performed this visit    Therapeutic procedures:  None    Plan:  Treatment plan: PRN.  Instructed patient: stretch as instructed at visit.  Short term goals: reduce pain.  Long term goals: restore normal function.  Prognosis: excellent.

## 2018-11-21 NOTE — PROGRESS NOTES
Subjective Finding:    Chief compalint: Patient presents with:  Neck Pain: sharp pains  , Pain Scale: 6/10, Intensity: sharp, Duration: 3 weeks, Radiating: no.    Date of injury:     Activities that the pain restricts:   Home/household/hobbies/social activities: yes.  Work duties: yes.  Sleep: yes.  Makes symptoms better: rest.  Makes symptoms worse: activity, cervical extension and cervical flexion.  Have you seen anyone else for the symptoms? No.  Work related: no.  Automobile related injury: no.    Objective and Assessment:    Posture Analysis:   High shoulder: .  Head tilt: .  High iliac crest: .  Head carriage: forward.  Thoracic Kyphosis: neutral.  Lumbar Lordosis: neutral.    Lumbar Range of Motion: .  Cervical Range of Motion: extension decreased, left lateral flexion decreased and right lateral flexion decreased.  Thoracic Range of Motion: .  Extremity Range of Motion: .    Palpation:   Lev scapulae: stiff, referred pain: no  Traps: sharp pain, referred pain: no    Segmental dysfunction pre-treatment and treatment area: C2, C3, C7 and T3.    Assessment post-treatment:  Cervical: ROM increased.  Thoracic: ROM increased.  Lumbar: .    Comments: .      Complicating Factors: .    Procedure(s):  CMT:  38366 Chiropractic manipulative treatment 1-2 regions performed   Cervical: Diversified, See above for level, Supine and Thoracic: Diversified, See above for level, Prone    Modalities:  None performed this visit    Therapeutic procedures:  None    Plan:  Treatment plan: PRN.  Instructed patient: stretch as instructed at visit.  Short term goals: reduce pain.  Long term goals: restore normal function.  Prognosis: excellent.

## 2018-11-26 ENCOUNTER — OFFICE VISIT (OUTPATIENT)
Dept: CHIROPRACTIC MEDICINE | Facility: OTHER | Age: 53
End: 2018-11-26
Attending: CHIROPRACTOR
Payer: COMMERCIAL

## 2018-11-26 DIAGNOSIS — M99.01 SEGMENTAL AND SOMATIC DYSFUNCTION OF CERVICAL REGION: Primary | ICD-10-CM

## 2018-11-26 DIAGNOSIS — M54.2 CERVICALGIA: ICD-10-CM

## 2018-11-26 DIAGNOSIS — M99.02 SEGMENTAL AND SOMATIC DYSFUNCTION OF THORACIC REGION: ICD-10-CM

## 2018-11-26 PROCEDURE — 98940 CHIROPRACT MANJ 1-2 REGIONS: CPT | Mod: AT | Performed by: CHIROPRACTOR

## 2018-11-26 NOTE — MR AVS SNAPSHOT
"              After Visit Summary   11/26/2018    Lakeisha Herman    MRN: [id number]           Patient Information     Date Of Birth          1965        Visit Information        Provider Department      11/26/2018 4:10 PM [facility]        Today's Diagnoses     Segmental and somatic dysfunction of cervical region    -  1    Cervicalgia        Segmental and somatic dysfunction of thoracic region           Follow-ups after your visit        Your next 10 appointments already scheduled     Dec 03, 2018  4:10 PM CST   [facility] ([facility]    [address]  [place] [address]   [phone]              Who to contact     If you have questions or need follow up information about today's clinic visit or your schedule please contact  [facility] directly at [phone].  Normal or non-critical lab and imaging results will be communicated to you by Ntractivehart, letter or phone within 4 business days after the clinic has received the results. If you do not hear from us within 7 days, please contact the clinic through MyChart or phone. If you have a critical or abnormal lab result, we will notify you by phone as soon as possible.  Submit refill requests through Keywee or call your pharmacy and they will forward the refill request to us. Please allow 3 business days for your refill to be completed.          Additional Information About Your Visit        MyChart Information     Keywee lets you send messages to your doctor, view your test results, renew your prescriptions, schedule appointments and more. To sign up, go to [email or web address] . Click on \"Log in\" on the left side of the screen, which will take you to the Welcome page. Then click on \"Sign up Now\" on the right side of the page.     You will be asked to enter the access code listed below, as well as some personal information. Please follow the " directions to create your username and password.     Your access code is: [email or web address]  Expires: 2019  8:34 AM     Your access code will  in 90 days. If you need help or a new code, please call your [facility] clinic or [phone].        Care EveryWhere ID     This is your Care EveryWhere ID. This could be used by other organizations to access your [facility] medical records  [id number]         Blood Pressure from Last 3 Encounters:   18 144/89   17 146/96   17 151/92    Weight from Last 3 Encounters:   No data found for Wt              We Performed the Following     CHIROPRAC MANIP,SPINAL,1-2 REGIONS        Primary Care Provider Office Phone # Fax #    Conchis L Ulices [phone]       [facility] [address] [facility] [address]        Equal Access to Services     TEVIN SEALS : Hadii aad ku hadasho Somaura, waaxda luqadaha, qaybta kaalmada adeegyada, waxay flyin germania llamas . So [facility] [phone].    ATENCIÓN: Si habla español, tiene a banks disposición servicios gratuitos de asistencia lingüística. Otoniel al [phone].    We comply with applicable federal civil rights laws and [place] laws. We do not discriminate on the basis of race, color, national origin, age, disability, sex, sexual orientation, or gender identity.            Thank you!     Thank you for choosing  [facility]  for your care. Our goal is always to provide you with excellent care. Hearing back from our patients is one way we can continue to improve our services. Please take a few minutes to complete the written survey that you may receive in the mail after your visit with us. Thank you!             Your Updated Medication List - Protect others around you: Learn how to safely use, store and throw away your medicines at www.disposemymeds.org.      Notice  As of 2018 11:59 PM    You have not been prescribed any medications.

## 2018-11-27 NOTE — PROGRESS NOTES
Subjective Finding:    Chief compalint: Patient presents with:  Neck Pain: getting better  , Pain Scale: 6/10, Intensity: sharp, Duration: 3 weeks, Radiating: no.    Date of injury:     Activities that the pain restricts:   Home/household/hobbies/social activities: yes.  Work duties: yes.  Sleep: yes.  Makes symptoms better: rest.  Makes symptoms worse: activity, cervical extension and cervical flexion.  Have you seen anyone else for the symptoms? No.  Work related: no.  Automobile related injury: no.    Objective and Assessment:    Posture Analysis:   High shoulder: .  Head tilt: .  High iliac crest: .  Head carriage: forward.  Thoracic Kyphosis: neutral.  Lumbar Lordosis: neutral.    Lumbar Range of Motion: .  Cervical Range of Motion: extension decreased, left lateral flexion decreased and right lateral flexion decreased.  Thoracic Range of Motion: .  Extremity Range of Motion: .    Palpation:   Lev scapulae: stiff, referred pain: no  Traps: sharp pain, referred pain: no    Segmental dysfunction pre-treatment and treatment area: C2, C3, C7 and T3.    Assessment post-treatment:  Cervical: ROM increased.  Thoracic: ROM increased.  Lumbar: .    Comments: .      Complicating Factors: .    Procedure(s):  CMT:  09528 Chiropractic manipulative treatment 1-2 regions performed   Cervical: Diversified, See above for level, Supine and Thoracic: Diversified, See above for level, Prone    Modalities:  None performed this visit    Therapeutic procedures:  None    Plan:  Treatment plan: PRN.  Instructed patient: stretch as instructed at visit.  Short term goals: reduce pain.  Long term goals: restore normal function.  Prognosis: excellent.

## 2019-07-15 ENCOUNTER — TELEPHONE (OUTPATIENT)
Dept: FAMILY MEDICINE | Facility: OTHER | Age: 54
End: 2019-07-15

## 2019-07-15 ENCOUNTER — OFFICE VISIT (OUTPATIENT)
Dept: FAMILY MEDICINE | Facility: OTHER | Age: 54
End: 2019-07-15
Attending: NURSE PRACTITIONER
Payer: COMMERCIAL

## 2019-07-15 VITALS
HEART RATE: 78 BPM | WEIGHT: 184 LBS | TEMPERATURE: 97.9 F | OXYGEN SATURATION: 96 % | DIASTOLIC BLOOD PRESSURE: 74 MMHG | SYSTOLIC BLOOD PRESSURE: 122 MMHG

## 2019-07-15 DIAGNOSIS — R30.0 DYSURIA: ICD-10-CM

## 2019-07-15 DIAGNOSIS — H00.014 HORDEOLUM EXTERNUM OF LEFT UPPER EYELID: Primary | ICD-10-CM

## 2019-07-15 LAB
ALBUMIN UR-MCNC: NEGATIVE MG/DL
APPEARANCE UR: CLEAR
BILIRUB UR QL STRIP: NEGATIVE
COLOR UR AUTO: ABNORMAL
GLUCOSE UR STRIP-MCNC: >1000 MG/DL
HGB UR QL STRIP: NEGATIVE
KETONES UR STRIP-MCNC: NEGATIVE MG/DL
LEUKOCYTE ESTERASE UR QL STRIP: NEGATIVE
NITRATE UR QL: NEGATIVE
PH UR STRIP: 5.5 PH (ref 4.7–8)
SOURCE: ABNORMAL
SP GR UR STRIP: 1.03 (ref 1–1.03)
UROBILINOGEN UR STRIP-MCNC: NORMAL MG/DL (ref 0–2)

## 2019-07-15 PROCEDURE — 81003 URINALYSIS AUTO W/O SCOPE: CPT | Performed by: NURSE PRACTITIONER

## 2019-07-15 PROCEDURE — 99213 OFFICE O/P EST LOW 20 MIN: CPT | Performed by: NURSE PRACTITIONER

## 2019-07-15 RX ORDER — POLYMYXIN B SULFATE AND TRIMETHOPRIM 1; 10000 MG/ML; [USP'U]/ML
1-2 SOLUTION OPHTHALMIC EVERY 4 HOURS
Qty: 1 BOTTLE | Refills: 0 | Status: SHIPPED | OUTPATIENT
Start: 2019-07-15 | End: 2020-08-02

## 2019-07-15 ASSESSMENT — PAIN SCALES - GENERAL: PAINLEVEL: NO PAIN (0)

## 2019-07-15 NOTE — TELEPHONE ENCOUNTER
7:47 AM    Reason for Call: OVERBOOK    Patient is having the following symptoms: Bump on eyelid for 1 week    The patient is requesting an appointment for today  with Genesis Falk , had seen her at [facility]    Was an appointment offered for this call?   Yes    Preferred method for responding to this message: [phone]    If we cannot reach you directly, may we leave a detailed response at the number you provided?  Kandi Cordoba

## 2019-07-15 NOTE — TELEPHONE ENCOUNTER
Patient scheduled for 2:15, The time that worked for her, If this is not ok , let me know, I will reschedule

## 2019-07-15 NOTE — NURSING NOTE
Chief Complaint   Patient presents with     Eye Problem       Initial /74 (BP Location: Left arm, Patient Position: Chair, Cuff Size: Adult Regular)   Pulse 78   Temp 97.9  F (36.6  C) (Tympanic)   Wt 83.5 kg (184 lb)   SpO2 96%  There is no height or weight on file to calculate BMI.  Medication Reconciliation: complete     Soledad Moore LPN

## 2020-03-02 ENCOUNTER — HEALTH MAINTENANCE LETTER (OUTPATIENT)
Age: 55
End: 2020-03-02

## 2020-07-08 ENCOUNTER — HOSPITAL ENCOUNTER (EMERGENCY)
Facility: HOSPITAL | Age: 55
Discharge: HOME | End: 2020-07-08
Attending: NURSE PRACTITIONER | Admitting: NURSE PRACTITIONER
Payer: OTHER MISCELLANEOUS

## 2020-07-08 ENCOUNTER — APPOINTMENT (OUTPATIENT)
Dept: GENERAL RADIOLOGY | Facility: HOSPITAL | Age: 55
End: 2020-07-08
Attending: NURSE PRACTITIONER
Payer: OTHER MISCELLANEOUS

## 2020-07-08 VITALS
OXYGEN SATURATION: 97 % | TEMPERATURE: 98.1 F | RESPIRATION RATE: 14 BRPM | SYSTOLIC BLOOD PRESSURE: 140 MMHG | DIASTOLIC BLOOD PRESSURE: 69 MMHG

## 2020-07-08 DIAGNOSIS — T14.8XXA CONTUSION OF SOFT TISSUE: ICD-10-CM

## 2020-07-08 PROCEDURE — 73140 X-RAY EXAM OF FINGER(S): CPT | Mod: TC,RT

## 2020-07-08 PROCEDURE — 99212 OFFICE O/P EST SF 10 MIN: CPT | Mod: Z6 | Performed by: NURSE PRACTITIONER

## 2020-07-08 PROCEDURE — G0463 HOSPITAL OUTPT CLINIC VISIT: HCPCS

## 2020-07-08 NOTE — ED AVS SNAPSHOT
[facility]  [address] [place] [address]  Phone:  [phone]                                    Lakeisha Herman   MRN: [id number]    Department:  [facility]   Date of Visit:  7/8/2020           After Visit Summary Signature Page    I have received my discharge instructions, and my questions have been answered. I have discussed any challenges I see with this plan with the nurse or doctor.    ..........................................................................................................................................  Patient/Patient Representative Signature      ..........................................................................................................................................  Patient Representative Print Name and Relationship to Patient    ..................................................               ................................................  Date                                   Time    ..........................................................................................................................................  Reviewed by Signature/Title    ...................................................              ..............................................  Date                                               Time          22EPIC Rev 08/18

## 2020-07-08 NOTE — ED PROVIDER NOTES
History     Chief Complaint   Patient presents with     Thumb Discomfort     rt thumb pain due to injury on sat     HPI  Lakeisha Herman is a 55 year old female who complains of right thumb pain after it was caught in a cart at her place of employment. Pain 4/10 and throbbing when she moves her thumb. Denies previous injury/ she took Aleve yesterday for her discomfort. She is right hand dominant. Denies numbness and tingling.    Musculoskeletal problem/pain      Duration: four days ago    Description  Location:  Right thumb (right handed)     Intensity:  1/10 when not moving 4/10 throbbing when moves    Accompanying signs and symptoms: none    History  Previous similar problem: no   Previous evaluation:  none    Precipitating or alleviating factors:  Trauma or overuse: YES- crush injury at work between two carts  Aggravating factors include: moving     Therapies tried and outcome: NSAID - Aleve last dose yesterday.     Allergies:  Allergies   Allergen Reactions     Penicillins Rash       Problem List:    There are no active problems to display for this patient.       Past Medical History:    History reviewed. No pertinent past medical history.    Past Surgical History:    History reviewed. No pertinent surgical history.    Family History:    No family history on file.    Social History:  Marital Status:   [4]  Social History     Tobacco Use     Smoking status: Never Smoker     Smokeless tobacco: Never Used   Substance Use Topics     Alcohol use: None     Drug use: None        Medications:    trimethoprim-polymyxin b (POLYTRIM) 21492-3.1 UNIT/ML-% ophthalmic solution          Review of Systems   Constitutional: Positive for activity change. Negative for chills and fever.   Gastrointestinal: Negative for nausea and vomiting.   Musculoskeletal:        Right thumb pain    Skin: Negative for color change.   Neurological: Negative for numbness.       Physical Exam   BP: 140/69  Heart Rate: 84  Temp: 98.1  F (36.7   C)  Resp: 14  SpO2: 97 %      Physical Exam  Vitals signs and nursing note reviewed.   Constitutional:       General: She is in acute distress.   Cardiovascular:      Rate and Rhythm: Normal rate.   Pulmonary:      Effort: Pulmonary effort is normal.   Musculoskeletal:         General: Swelling and tenderness present.        Hands:    Skin:     General: Skin is warm and dry.      Capillary Refill: Capillary refill takes less than 2 seconds.   Neurological:      Mental Status: She is alert and oriented to person, place, and time.   Psychiatric:         Behavior: Behavior normal.         ED Course        Procedures           Results for orders placed or performed during the hospital encounter of 07/08/20 (from the past 24 hour(s))   XR Finger Right G/E 2 Views    Narrative    Exam: XR FINGER RT G/E 2 VW     History:Female, age 55 years, Crush injury between shopping carts    Comparison:  None    Technique: Three views are submitted.    Findings: Bones are normally mineralized. No evidence of acute or  subacute fracture.  No evidence of dislocation.           Impression    Impression:  No evidence of acute or subacute bony abnormality. Mild soft tissue  swelling.    ALEJANDRA RODRÍGUEZ MD       Medications - No data to display    Assessments & Plan (with Medical Decision Making)     I have reviewed the nursing notes.    I have reviewed the findings, diagnosis, plan and need for follow up with the patient.  (T14.8XXA) Contusion of soft tissue - right thumb  Comment:  55 year old female who complains of right thumb pain after it was caught in a cart at her place of employment. Pain 4/10 and throbbing when she moves her thumb. Denies previous injury/ she took Aleve yesterday for her discomfort. She is right hand dominant. Denies numbness and tingling.    Assessment: Mild to moderate amount of swelling MIP joint of right thumb. CMS good. Normal ROM.     Left thumb x-ray reviewed and per radiology:  Impression:  No evidence  of acute or subacute bony abnormality. Mild soft tissue  swelling.    Plan: Keep affected extremity elevated as much as possible for next 24 - 48 hours. Ice to affected area 20 minutes every hour as needed for comfort. After 48 hours you can apply heat. Ibuprofen 600 to 800 mg (3 - 4 tabs of over the counter med) every six to eight hours as needed;not to exceed maximum amount of 3200 mg in 24 hours.Tylenol 650 to 1000 mg every four to six hours as needed (not to exceed more than 4000 mg in a 24 hour period). May use interchangeably. Suggest medicating around the clock for the next 24-48 hours. Use thumb splint until you can move your thumb without pain.  Slowly start to wiggle your thumb and move  as often as possible but not beyond the point of pain. Follow up with primary provider or as needed  These discharge instructions and medications were reviewed with her and understanding verbalized.    Discharge Medication List as of 7/8/2020  5:00 PM          Final diagnoses:   Contusion of soft tissue - right thumb       7/8/2020   [facility]       Gavi Garcia, JANE  07/11/20 9454

## 2020-07-08 NOTE — DISCHARGE INSTRUCTIONS
Keep affected extremity elevated as much as possible for next 24 - 48 hours. Ice to affected area 20 minutes every hour as needed for comfort. After 48 hours you can apply heat. Ibuprofen 600 to 800 mg (3 - 4 tabs of over the counter med) every six to eight hours as needed;not to exceed maximum amount of 3200 mg in 24 hours.Tylenol 650 to 1000 mg every four to six hours as needed (not to exceed more than 4000 mg in a 24 hour period). May use interchangeably. Suggest medicating around the clock for the next 24-48 hours. Use thumb splint until you can move your thumb without pain.  Slowly start to wiggle your thumb and move  as often as possible but not beyond the point of pain. Follow up with primary provider or as needed

## 2020-07-11 ASSESSMENT — ENCOUNTER SYMPTOMS
COLOR CHANGE: 0
ACTIVITY CHANGE: 1
CHILLS: 0
FEVER: 0
NAUSEA: 0
NUMBNESS: 0
VOMITING: 0

## 2020-08-02 ENCOUNTER — HOSPITAL ENCOUNTER (EMERGENCY)
Facility: HOSPITAL | Age: 55
Discharge: HOME | End: 2020-08-02
Attending: NURSE PRACTITIONER | Admitting: NURSE PRACTITIONER
Payer: OTHER MISCELLANEOUS

## 2020-08-02 ENCOUNTER — APPOINTMENT (OUTPATIENT)
Dept: GENERAL RADIOLOGY | Facility: HOSPITAL | Age: 55
End: 2020-08-02
Attending: NURSE PRACTITIONER
Payer: OTHER MISCELLANEOUS

## 2020-08-02 VITALS
DIASTOLIC BLOOD PRESSURE: 77 MMHG | SYSTOLIC BLOOD PRESSURE: 125 MMHG | TEMPERATURE: 98 F | RESPIRATION RATE: 16 BRPM | OXYGEN SATURATION: 98 %

## 2020-08-02 DIAGNOSIS — S69.91XD INJURY OF RIGHT THUMB, SUBSEQUENT ENCOUNTER: ICD-10-CM

## 2020-08-02 PROCEDURE — 73140 X-RAY EXAM OF FINGER(S): CPT | Mod: TC,RT

## 2020-08-02 PROCEDURE — 99212 OFFICE O/P EST SF 10 MIN: CPT | Mod: Z6 | Performed by: NURSE PRACTITIONER

## 2020-08-02 PROCEDURE — G0463 HOSPITAL OUTPT CLINIC VISIT: HCPCS

## 2020-08-02 ASSESSMENT — ENCOUNTER SYMPTOMS
CONSTITUTIONAL NEGATIVE: 1
CARDIOVASCULAR NEGATIVE: 1
ENDOCRINE NEGATIVE: 1
EYES NEGATIVE: 1
NEUROLOGICAL NEGATIVE: 1
GASTROINTESTINAL NEGATIVE: 1
PSYCHIATRIC NEGATIVE: 1
HEMATOLOGIC/LYMPHATIC NEGATIVE: 1
ALLERGIC/IMMUNOLOGIC NEGATIVE: 1
RESPIRATORY NEGATIVE: 1

## 2020-08-02 NOTE — ED TRIAGE NOTES
Pt reports around July 4th her right thumb got squished between 2 carts at work. Pt c/o increased pain and not healing well and employer told her to come in for a re-check.

## 2020-08-02 NOTE — DISCHARGE INSTRUCTIONS
Continue with thumb splint as needed for comfort  Ice/heat for comfort  Tylenol or ibuprofen for comfort     Follow up with orthopedics

## 2020-08-02 NOTE — ED AVS SNAPSHOT
[facility]  [address] [place] [address]  Phone:  [phone]                                    Laekisha Herman   MRN: [id number]    Department[facility]  [facility]   Date of Visit:  8/2/2020           After Visit Summary Signature Page    I have received my discharge instructions, and my questions have been answered. I have discussed any challenges I see with this plan with the nurse or doctor.    ..........................................................................................................................................  Patient/Patient Representative Signature      ..........................................................................................................................................  Patient Representative Print Name and Relationship to Patient    ..................................................               ................................................  Date                                   Time    ..........................................................................................................................................  Reviewed by Signature/Title    ...................................................              ..............................................  Date                                               Time          22EPIC Rev 08/18

## 2020-08-02 NOTE — ED PROVIDER NOTES
History     Chief Complaint   Patient presents with     Thumb Discomfort     The history is provided by the patient.     Lakeisha Herman is a 55 year old female who presents to the  for right thumb pain.  She was seen in the  7/8/2020 with soft tissue injury. The pain has decreased.  She states she still has lumps to the DIP joint of the right thumb.  She does not have a primary care provider at this time. She continues to use a thumb splint as needed and aleve as needed.     Allergies:  Allergies   Allergen Reactions     Penicillins Rash       Problem List:    There are no active problems to display for this patient.       Past Medical History:    History reviewed. No pertinent past medical history.    Past Surgical History:    History reviewed. No pertinent surgical history.    Family History:    No family history on file.    Social History:  Marital Status:   [4]  Social History     Tobacco Use     Smoking status: Never Smoker     Smokeless tobacco: Never Used   Substance Use Topics     Alcohol use: None     Drug use: None        Medications:    No current outpatient medications on file.        Review of Systems   Constitutional: Negative.    HENT: Negative.    Eyes: Negative.    Respiratory: Negative.    Cardiovascular: Negative.    Gastrointestinal: Negative.    Endocrine: Negative.    Genitourinary: Negative.    Musculoskeletal:        Right thumb DIP discomfort with decreased ROM   Skin:        2 bumps noted around the DIP joint right thumb   Allergic/Immunologic: Negative.    Neurological: Negative.    Hematological: Negative.    Psychiatric/Behavioral: Negative.        Physical Exam   BP: 125/77  Heart Rate: 89  Temp: 98  F (36.7  C)  Resp: 16  SpO2: 98 %      Physical Exam  Vitals signs and nursing note reviewed.   Constitutional:       General: She is not in acute distress.  Cardiovascular:      Rate and Rhythm: Normal rate.      Pulses: Normal pulses.   Pulmonary:      Effort: Pulmonary  effort is normal. No respiratory distress.   Musculoskeletal:      Comments: Right distal thumb weakness, tender to touch, tender with forward flexion, lump noted around the DIP joint   Skin:     General: Skin is warm.      Capillary Refill: Capillary refill takes less than 2 seconds.      Findings: No erythema or rash.   Neurological:      General: No focal deficit present.      Mental Status: She is alert and oriented to person, place, and time.   Psychiatric:         Mood and Affect: Mood normal.         Behavior: Behavior normal.         ED Course        Procedures               Critical Care time:  none               No results found for this or any previous visit (from the past 24 hour(s)).    Medications - No data to display    Assessments & Plan (with Medical Decision Making)     I have reviewed the nursing notes.    I have reviewed the findings, diagnosis, plan and need for follow up with the patient.        Compared XR to previous XR no change noted. Waiting on official read.   Discussed options of physical therapy vs orthopedics. At this time with her a month out a referral to orthopedics seems appropriate.      Elevate injured area above heart as often as possible and when resting. Take OTC motrin 800 mg every 8 hours as needed for pain/swelling. Apply ice at least three times a day x 20 minutes.   Discussed RICE with rest, ice, compression and elevation  Patient verbally educated and given appropriate education sheets for the diagnoses and has no questions.  Take medications as directed.   Follow up with your Primary Care provider if symptoms increase or if further concerns develop, return to the ER    Continue with thumb splint as needed for comfort  Ice/heat for comfort  Tylenol or ibuprofen for comfort     Follow up with orthopedics       New Prescriptions    No medications on file       Final diagnoses:   Injury of right thumb, subsequent encounter       8/2/2020   [facility]      Kezia, Agnes Serna, APRN CNP  08/02/20 4114

## 2020-08-02 NOTE — ED TRIAGE NOTES
Pt is here with c/o right thumb pain   Pt reports she was seen in [facility] on July 8th for initial injury   Pt reports it was also xray  And nor fracture was seen but she has been using it still   No otc

## 2020-08-10 NOTE — PROGRESS NOTES
Subjective     Lakeisha Herman is a 55 year old female who presents to clinic today for the following health issues:    HPI   New Patient/Transfer of Care  At this time, past medical history, current medications, allergies and drug sensitivities, immunizations, habits and life style, family history, and social history are reviewed and updated. Due for a mammogram. Willing to complete. Due for a colonoscopy. Declines, but she is willing to do the Cologuard. Due for a Tdap, pneumococcal, and shingles vaccines. Willing to get the Tdap and pneumococcal vaccines, but declines the Shingles. Also due for a CP with a pap and lab work. Willing to schedule.     Diabetes Follow-up      How often are you checking your blood sugar? Not at all    What concerns do you have today about your diabetes? Wants to get back on track, has not been seen for DM in several months     Do you have any of these symptoms? (Select all that apply)  Numbness in feet     Does not take a statin. Not fasting today. Will redraw fasting labs prior to follow up appointment.     She has taken metformin in the past. Reasons unclear why she stopped taking the medication.     Due for eye exam. Will schedule.     Not on asa, but willing to restart.     No chest pain, shortness of breath, dizziness, syncope, or palpitations. No lower leg edema. Some numbness in bilateral feet.     She notes that she has had several wood ticks on her in the past. Wanting to know if she can get tested for lyme disease as she has been more tired. No erythema migrans rash.     Patient often gets thick phlegm caught in her throat. Trouble swallowing food when the phlegm is so thick. Once she coughs it up, she has no trouble swallowing. No nasal congestion. No sinus pressure. No fevers. No throat pain. No wheezes or shortness of breath. Sputum is clear. No blood noted.      BP Readings from Last 2 Encounters:   08/12/20 124/76   08/02/20 125/77     No results found for: A1C,  LDL            Hypertension Follow-up      Do you check your blood pressure regularly outside of the clinic? No     Are you following a low salt diet? Yes    Are your blood pressures ever more than 140 on the top number (systolic) OR more   than 90 on the bottom number (diastolic), for example 140/90? No   -She does not smoke.   -No chest pain or shortness of breath. No dizziness, syncope, or palpitations.     Patient also complains of chronic left hip pain. She did fall about a year ago and landed on her left hip, but is unsure if this is the same pain. No erythema or ecchymosis, but some edema. She has seen ortho in the past and a [facility] US was ordered-8/18/17. This showed:     CONCLUSIONS:   1. Bony irregularity of the greater trochanter likely resulting in a greater trochanteric bursopathy as well as some early attritional changes of the overlying iliotibial band  2. Attritional changes of the gluteus minimus tendon which is otherwise intact  3. Normal-appearing tensor fascia luis muscle beneath the patient's identified area of prominence within the anterolateral hip    She notes that at this time, Dr. [id number] to drain her hip, but she declined. She notes that her pain has worsened and she now has trouble walking. Also laying on her left hip causes her the most pain. She does not take anything for the pain. No weakness. No numbness or tingling.       How many servings of fruits and vegetables do you eat daily?  2-3    On average, how many sweetened beverages do you drink each day (Examples: soda, juice, sweet tea, etc.  Do NOT count diet or artificially sweetened beverages)?   0    How many days per week do you exercise enough to make your heart beat faster? 4    How many minutes a day do you exercise enough to make your heart beat faster? 20 - 29    How many days per week do you miss taking your medication? 0      Patient Active Problem List   Diagnosis     Allergic rhinitis     Diabetes  "mellitus, type 2 (H)     Fibroids     Hypertension     Left knee pain     Obesity     Past Surgical History:   Procedure Laterality Date     cyst removed      neck     XR WRIST SURGERY JACY LEFT      unsure specifics       Social History     Tobacco Use     Smoking status: Never Smoker     Smokeless tobacco: Never Used   Substance Use Topics     Alcohol use: Not on file     Family History   Problem Relation Age of Onset     Hypoglycemia Mother      Cerebrovascular Disease Maternal Grandmother      Dementia Maternal Grandmother          Current Outpatient Medications   Medication Sig Dispense Refill     aspirin (ASA) 81 MG EC tablet Take 1 tablet (81 mg) by mouth daily       cetirizine (ZYRTEC) 10 MG tablet Take 1 tablet (10 mg) by mouth daily 90 tablet 3     Allergies   Allergen Reactions     Penicillins Rash       Reviewed and updated as needed this visit by Provider  Allergies  Meds  Med Hx  Surg Hx  Fam Hx         Review of Systems   As noted in the HPI.       Objective    /76 (BP Location: Left arm, Patient Position: Sitting, Cuff Size: Adult Regular)   Pulse 90   Temp 98.6  F (37  C) (Tympanic)   Ht 1.676 m (5' 6\")   Wt 81.6 kg (180 lb)   SpO2 97%   BMI 29.05 kg/m    Body mass index is 29.05 kg/m .  Physical Exam   GENERAL: healthy, alert and no distress  EYES: Eyes grossly normal to inspection, PERRL and conjunctivae and sclerae normal  HENT: ear canals and TM's normal, nose and mouth without ulcers or lesions  NECK: no adenopathy  RESP: lungs clear to auscultation - no rales, rhonchi or wheezes  CV: regular rate and rhythm, normal S1 S2, no S3 or S4, no murmur, click or rub, no peripheral edema and peripheral pulses strong  NEURO: Normal strength and tone, mentation intact and speech normal  PSYCH: mentation appears normal, affect normal/bright  Diabetic foot exam: normal DP and PT pulses, no trophic changes or ulcerative lesions, onychomycosis and slightly decreased sensation on bilateral " heels  LEFT HIP: Skin intact. No edema, erythema, or ecchymosis, but she is tender directly over the left trochanteric bursa. No pain with adduction, abduction, flexion, extension, or internal/external rotation.     Diagnostic Test Results:  none         Assessment & Plan     1. Encounter to establish care  Patient is in need of a new provider. she has been explained the role of a CNP and the fact that I do not follow patients in the hospital. she was told that should he get admitted, he would then be followed by a hospitalist. she verbalizes understanding and would like to establish a relationship now. Will update Tdap and pneumococcal vaccines today.     2. Lipid screening  Has been on statin in the past. Does have DM and should restart. Not fasting, but she agrees to come to her CP fasting and will schedule labs prior. Will notify patient of the results when available and intervene accordingly.     3. Encounter for hepatitis C screening test for low risk patient  - Hepatitis C Screen Reflex to HCV RNA Quant and Genotype; Future; Will notify patient of the results when available and intervene accordingly.     4. Essential hypertension  Well controlled. Continue current medications. Encouraged daily exercise and a low sodium diet. Recommended checking BP's 2x/wk, call the clinic if consistantly s>140 or d>90. Follow up in 6 months.     - Comprehensive metabolic panel (BMP + Alb, Alk Phos, ALT, AST, Total. Bili, TP); Future    5. Screening for HIV (human immunodeficiency virus)  - HIV Antigen Antibody Combo; Future; Will notify patient of the results when available and intervene accordingly.     6. Encounter for screening mammogram for breast cancer  - MA Screen Bilateral w/Antelmo; Future; Will notify patient of the results when available and intervene accordingly.     7. Type 2 diabetes mellitus without complication, without long-term current use of insulin (H)  Due for labs. Declines today. Will recheck prior to CP.  "Labs ordered. Will notify patient of the results when available and intervene accordingly. She will also schedule eye exam and begin asa.     - Hemoglobin A1c; Future  - Albumin Random Urine Quantitative with Creat Ratio; Future  - TSH with free T4 reflex; Future    8. Idiopathic peripheral neuropathy  Most likely from her DM. No recent A1C. Declined today. Will recheck A1C and intervene based on the results. She also requests a lyme screen. Will notify patient of the results when available and intervene accordingly.     - Lyme Disease Kinjal with reflex to WB Serum; Future    9. Phlegm in throat  No red flags noted. No blood noted. Will try treating with Zyrtec and reassess at physical.     - cetirizine (ZYRTEC) 10 MG tablet; Take 1 tablet (10 mg) by mouth daily  Dispense: 90 tablet; Refill: 3    10. Trochanteric bursitis, left hip  Will refer to ortho for injection and management. US done in 8/2017. Will have images pushed.     - ORTHOPEDIC ADULT REFERRAL; Future    11. Encounter for screening colonoscopy  Cologuard given. Will notify patient of the results when available and intervene accordingly.        BMI:   Estimated body mass index is 29.05 kg/m  as calculated from the following:    Height as of this encounter: 1.676 m (5' 6\").    Weight as of this encounter: 81.6 kg (180 lb).         No follow-ups on file.    Genesis Falk NP  [facility] - [place]    "

## 2020-08-12 ENCOUNTER — OFFICE VISIT (OUTPATIENT)
Dept: FAMILY MEDICINE | Facility: OTHER | Age: 55
End: 2020-08-12
Attending: NURSE PRACTITIONER
Payer: COMMERCIAL

## 2020-08-12 VITALS
BODY MASS INDEX: 28.93 KG/M2 | TEMPERATURE: 98.6 F | HEIGHT: 66 IN | WEIGHT: 180 LBS | OXYGEN SATURATION: 97 % | HEART RATE: 90 BPM | SYSTOLIC BLOOD PRESSURE: 124 MMHG | DIASTOLIC BLOOD PRESSURE: 76 MMHG

## 2020-08-12 DIAGNOSIS — Z12.31 ENCOUNTER FOR SCREENING MAMMOGRAM FOR BREAST CANCER: ICD-10-CM

## 2020-08-12 DIAGNOSIS — I10 ESSENTIAL HYPERTENSION: ICD-10-CM

## 2020-08-12 DIAGNOSIS — R09.89 PHLEGM IN THROAT: ICD-10-CM

## 2020-08-12 DIAGNOSIS — Z11.4 SCREENING FOR HIV (HUMAN IMMUNODEFICIENCY VIRUS): ICD-10-CM

## 2020-08-12 DIAGNOSIS — Z11.59 ENCOUNTER FOR HEPATITIS C SCREENING TEST FOR LOW RISK PATIENT: ICD-10-CM

## 2020-08-12 DIAGNOSIS — Z76.89 ENCOUNTER TO ESTABLISH CARE: Primary | ICD-10-CM

## 2020-08-12 DIAGNOSIS — G60.9 IDIOPATHIC PERIPHERAL NEUROPATHY: ICD-10-CM

## 2020-08-12 DIAGNOSIS — E11.9 TYPE 2 DIABETES MELLITUS WITHOUT COMPLICATION, WITHOUT LONG-TERM CURRENT USE OF INSULIN (H): ICD-10-CM

## 2020-08-12 DIAGNOSIS — Z12.11 ENCOUNTER FOR SCREENING COLONOSCOPY: ICD-10-CM

## 2020-08-12 DIAGNOSIS — M70.62 TROCHANTERIC BURSITIS, LEFT HIP: ICD-10-CM

## 2020-08-12 DIAGNOSIS — Z13.220 LIPID SCREENING: ICD-10-CM

## 2020-08-12 PROCEDURE — 90715 TDAP VACCINE 7 YRS/> IM: CPT | Performed by: NURSE PRACTITIONER

## 2020-08-12 PROCEDURE — 99214 OFFICE O/P EST MOD 30 MIN: CPT | Mod: 25 | Performed by: NURSE PRACTITIONER

## 2020-08-12 PROCEDURE — 90472 IMMUNIZATION ADMIN EACH ADD: CPT | Performed by: NURSE PRACTITIONER

## 2020-08-12 PROCEDURE — 90471 IMMUNIZATION ADMIN: CPT | Performed by: NURSE PRACTITIONER

## 2020-08-12 PROCEDURE — 90732 PPSV23 VACC 2 YRS+ SUBQ/IM: CPT | Performed by: NURSE PRACTITIONER

## 2020-08-12 RX ORDER — CETIRIZINE HYDROCHLORIDE 10 MG/1
10 TABLET ORAL DAILY
Qty: 90 TABLET | Refills: 3 | Status: SHIPPED | OUTPATIENT
Start: 2020-08-12 | End: 2021-01-13

## 2020-08-12 ASSESSMENT — ANXIETY QUESTIONNAIRES
3. WORRYING TOO MUCH ABOUT DIFFERENT THINGS: NOT AT ALL
6. BECOMING EASILY ANNOYED OR IRRITABLE: SEVERAL DAYS
1. FEELING NERVOUS, ANXIOUS, OR ON EDGE: NOT AT ALL
GAD7 TOTAL SCORE: 1
5. BEING SO RESTLESS THAT IT IS HARD TO SIT STILL: NOT AT ALL
IF YOU CHECKED OFF ANY PROBLEMS ON THIS QUESTIONNAIRE, HOW DIFFICULT HAVE THESE PROBLEMS MADE IT FOR YOU TO DO YOUR WORK, TAKE CARE OF THINGS AT HOME, OR GET ALONG WITH OTHER PEOPLE: NOT DIFFICULT AT ALL
2. NOT BEING ABLE TO STOP OR CONTROL WORRYING: NOT AT ALL
4. TROUBLE RELAXING: NOT AT ALL
7. FEELING AFRAID AS IF SOMETHING AWFUL MIGHT HAPPEN: NOT AT ALL

## 2020-08-12 ASSESSMENT — MIFFLIN-ST. JEOR: SCORE: 1418.22

## 2020-08-12 ASSESSMENT — PAIN SCALES - GENERAL: PAINLEVEL: MILD PAIN (3)

## 2020-08-12 ASSESSMENT — PATIENT HEALTH QUESTIONNAIRE - PHQ9: SUM OF ALL RESPONSES TO PHQ QUESTIONS 1-9: 2

## 2020-08-12 NOTE — NURSING NOTE
"Chief Complaint   Patient presents with     [facility] Care     Diabetes     Hypertension       Initial /76 (BP Location: Left arm, Patient Position: Sitting, Cuff Size: Adult Regular)   Pulse 90   Temp 98.6  F (37  C) (Tympanic)   Ht 1.676 m (5' 6\")   Wt 81.6 kg (180 lb)   SpO2 97%   BMI 29.05 kg/m   Estimated body mass index is 29.05 kg/m  as calculated from the following:    Height as of this encounter: 1.676 m (5' 6\").    Weight as of this encounter: 81.6 kg (180 lb).  Medication Reconciliation: complete  Izabel Camp LPN  "

## 2020-08-13 ASSESSMENT — ANXIETY QUESTIONNAIRES: GAD7 TOTAL SCORE: 1

## 2020-08-25 ENCOUNTER — MEDICAL CORRESPONDENCE (OUTPATIENT)
Dept: MRI IMAGING | Facility: HOSPITAL | Age: 55
End: 2020-08-25

## 2020-08-28 ENCOUNTER — ANCILLARY PROCEDURE (OUTPATIENT)
Dept: MAMMOGRAPHY | Facility: OTHER | Age: 55
End: 2020-08-28
Attending: NURSE PRACTITIONER
Payer: COMMERCIAL

## 2020-08-28 DIAGNOSIS — Z12.31 ENCOUNTER FOR SCREENING MAMMOGRAM FOR BREAST CANCER: ICD-10-CM

## 2020-08-28 PROCEDURE — 77063 BREAST TOMOSYNTHESIS BI: CPT | Mod: TC

## 2020-08-28 PROCEDURE — 77067 SCR MAMMO BI INCL CAD: CPT | Mod: TC

## 2020-09-01 ENCOUNTER — HOSPITAL ENCOUNTER (OUTPATIENT)
Dept: MRI IMAGING | Facility: HOSPITAL | Age: 55
End: 2020-09-01
Attending: ORTHOPAEDIC SURGERY
Payer: COMMERCIAL

## 2020-09-01 DIAGNOSIS — R22.42 MASS OF LEFT THIGH: ICD-10-CM

## 2020-09-01 DIAGNOSIS — M79.652 PAIN IN LEFT THIGH: ICD-10-CM

## 2020-09-01 DIAGNOSIS — M25.611 DECREASED ROM OF RIGHT SHOULDER: ICD-10-CM

## 2020-09-01 DIAGNOSIS — M25.511 RIGHT SHOULDER PAIN: ICD-10-CM

## 2020-09-01 LAB
CREAT BLD-MCNC: 0.5 MG/DL (ref 0.52–1.04)
GFR SERPL CREATININE-BSD FRML MDRD: >90 ML/MIN/{1.73_M2}

## 2020-09-01 PROCEDURE — 73221 MRI JOINT UPR EXTREM W/O DYE: CPT | Mod: TC,RT

## 2020-09-01 PROCEDURE — 82565 ASSAY OF CREATININE: CPT

## 2020-09-01 PROCEDURE — 73720 MRI LWR EXTREMITY W/O&W/DYE: CPT | Mod: TC,LT

## 2020-09-01 PROCEDURE — A9585 GADOBUTROL INJECTION: HCPCS | Performed by: RADIOLOGY

## 2020-09-01 PROCEDURE — 25500064 ZZH RX 255 OP 636: Performed by: RADIOLOGY

## 2020-09-01 RX ORDER — GADOBUTROL 604.72 MG/ML
7.5 INJECTION INTRAVENOUS ONCE
Status: COMPLETED | OUTPATIENT
Start: 2020-09-01 | End: 2020-09-01

## 2020-09-01 RX ADMIN — GADOBUTROL 7.5 ML: 604.72 INJECTION INTRAVENOUS at 13:49

## 2020-11-17 NOTE — ED PROVIDER NOTES
History     Chief Complaint   Patient presents with     Thumb Discomfort     Pt was seen here last night and wants a different brace/splint.     The history is provided by the patient. No  was used.     Lakeisha Herman is a 51 year old female who presents requesting a thumb splint. States she was here yesterday and placed in a thumb splint but it is too bulky for her to work in and she isn't wearing it. Has taken the coban and wrapped this around her thumb for comfort. States she injured her thumb d/t overuse at work.     I have reviewed the Medications, Allergies, Past Medical and Surgical History, and Social History in the Epic system.    Review of Systems   Constitutional: Negative.    Musculoskeletal:        Left thumb pain. Worse with activity.        Physical Exam   BP: 146/96 mmHg  Pulse: 79  Temp: 98.4  F (36.9  C)  Resp: 16  SpO2: 99 %  Physical Exam   Constitutional: She appears well-developed and well-nourished. No distress.   Pulmonary/Chest: Effort normal.   Musculoskeletal:        Left hand: She exhibits tenderness. She exhibits normal range of motion, normal capillary refill and no swelling.        Hands:  Skin: Skin is warm and dry. She is not diaphoretic.   Nursing note and vitals reviewed.      ED Course   Procedures        Assessments & Plan (with Medical Decision Making)     I have reviewed the nursing notes.  I have reviewed the findings, diagnosis, plan and need for follow up with the patient.  Referral placed to OT.  Originally referred to PT, changed order to OT, not able to cancel PT referral.    Will have OT determine appropriate therapy for treatment.   Can use OTC splint or ace if needed.   Ibuprofen for pain PRN.   Elevate and rest the hand as needed.     Final diagnoses:   Overuse syndrome - Left Thumb       1/13/2017   [facility]      Justa Diaz NP  01/13/17 [id number]  
100

## 2020-11-18 ENCOUNTER — TELEPHONE (OUTPATIENT)
Dept: FAMILY MEDICINE | Facility: OTHER | Age: 55
End: 2020-11-18

## 2020-11-18 NOTE — TELEPHONE ENCOUNTER
Please call the patient back.  She would like to speak to the nurse about some labs she had done and maybe set up an appointment.    [phone]

## 2020-12-14 NOTE — PROGRESS NOTES
[facility] - [place]  [address] [place] [address]  Phone: [phone]  Primary Provider: Genesis Falk      PREOPERATIVE EVALUATION:  Today's date: 12/16/2020    Lakeisha Herman is a 55 year old female who presents for a preoperative evaluation.    Surgical Information:  Surgery/Procedure: Right Shoulder Scope, Rotator Cuff repair   Surgery Location: [facility]    Surgeon: Dr. Yuan  Surgery Date: 12/21/20  Time of Surgery: TBD  Where patient plans to recover: At home with family  Fax number for surgical facility: [phone] also needs to be faxed to [phone]     Type of Anesthesia Anticipated: to be determined    Preoperative Questionnaire:   No - Have you ever had a heart attack or stroke?  No - Have you ever had surgery on your heart or blood vessels, such as a stent, coronary (heart) bypass, or surgery on an artery in the head, neck, heart, or legs?  No - Do you have chest pain when you are physically active?  No - Do you have a history of heart failure?  No - Do you currently have a cold, bronchitis, or symptoms of other respiratory (head and chest) infections?  No - Do you have a cough, shortness of breath, or wheezing?  No - Do you or anyone in your family have a history of blood clots?  No - Do you or anyone in your family have a serious bleeding problem, such as long-lasting bleeding after surgeries or cuts?  No - Have you ever had anemia or been told to take iron pills?  No - Have you had any abnormal blood loss such as black, tarry or bloody stools, or abnormal vaginal bleeding?  No - Have you ever had a blood transfusion?  Yes - Are you willing to have a blood transfusion if it is medically needed before, during, or after your surgery?  Yes, Grandfather, trouble waking him up- Have you or anyone in your family ever had problems with anesthesia (sedation for surgery)?  No - Do you have sleep apnea, excessive snoring, or daytime drowsiness?    No - Do you have any artifical heart valves or other implanted medical devices, such as a pacemaker, defibrillator, or continuous glucose monitor?  No - Do you have any artifical joints?  No - Are you allergic to latex?  No - Is there any chance that you may be pregnant?    Patient has a Health Care Directive or Living Will:  No       Subjective     HPI related to upcoming procedure: Patient with chronic right shoulder pain. MRI done on 9/1/20 and showed large full thickness rotator cuff tear and effusion. She has met with Dr. Yuan who plans to proceed with surgery.       Health Care Directive:  Patient does not have a Health Care Directive or Living Will: Discussed advance care planning with patient; however, patient declined at this time.  [id number]}    Status of Chronic Conditions:  DIABETES - Patient has a longstanding history of Diabetes Type II . Patient is being treated with diet and exercise. She has taken Metformin in the past, unsure why she stopped it. Denies any side effects. Control has been poor, A1C today 12.1. Complicating factors include but are not limited to: hypertension, hyperlipidemia and morbid obesity.    HYPERTENSION - Patient has longstanding history of HTN , currently denies any symptoms referable to elevated blood pressure. Specifically denies chest pain, palpitations, dyspnea, orthopnea, PND or peripheral edema. Blood pressure readings have been in normal range. She currently is not taking anything for her HTN. Was taking lisinopril, but stopped. Unsure why. Patient denies any side effects of medication.     Covid testing done this morning.     Mets greater than 4.    Review of Systems  CONSTITUTIONAL: NEGATIVE for fever, chills, change in weight  INTEGUMENTARY/SKIN: NEGATIVE for worrisome rashes, moles or lesions  EYES: NEGATIVE for vision changes or irritation  ENT/MOUTH: NEGATIVE for ear, mouth and throat problems  RESP: NEGATIVE for significant cough or SOB  CV: NEGATIVE for chest  pain, palpitations or peripheral edema  GI: NEGATIVE for nausea, abdominal pain, heartburn, or change in bowel habits  : NEGATIVE for frequency, dysuria, or hematuria  NEURO: NEGATIVE for weakness, dizziness or paresthesias  ENDOCRINE: NEGATIVE for temperature intolerance, skin/hair changes  HEME: NEGATIVE for bleeding problems  PSYCHIATRIC: NEGATIVE for changes in mood or affect    Patient Active Problem List    Diagnosis Date Noted     Left knee pain 03/09/2016     Priority: Medium     Obesity 03/13/2014     Priority: Medium     Overview:   Updated per 10/1/17 IMO import       Fibroids 09/02/2013     Priority: Medium     Diabetes mellitus, type 2 (H) 11/06/2007     Priority: Medium     Overview:   HGA1C      7.8   5/29/2014  Goal: <7.0        Hypertension 10/23/2007     Priority: Medium     Allergic rhinitis 09/05/2007     Priority: Medium      History reviewed. No pertinent past medical history.  Past Surgical History:   Procedure Laterality Date     cyst removed      neck     XR WRIST SURGERY JCAY LEFT      unsure specifics     Current Outpatient Medications   Medication Sig Dispense Refill     aspirin (ASA) 81 MG EC tablet Take 1 tablet (81 mg) by mouth daily       atorvastatin (LIPITOR) 20 MG tablet Take 1 tablet (20 mg) by mouth daily 90 tablet 3     cetirizine (ZYRTEC) 10 MG tablet Take 1 tablet (10 mg) by mouth daily 90 tablet 3     lisinopril (ZESTRIL) 5 MG tablet Take 1 tablet (5 mg) by mouth daily 90 tablet 3     metFORMIN (GLUCOPHAGE-XR) 500 MG 24 hr tablet Take 2 tablets (1,000 mg) by mouth 2 times daily (with meals) 120 tablet 0       Allergies   Allergen Reactions     Penicillins Rash        Social History     Tobacco Use     Smoking status: Never Smoker     Smokeless tobacco: Never Used   Substance Use Topics     Alcohol use: Not on file     Family History   Problem Relation Age of Onset     Hypoglycemia Mother      Cerebrovascular Disease Maternal Grandmother      Dementia Maternal Grandmother   "    History   Drug Use Not on file         Objective     /72 (BP Location: Right arm, Patient Position: Chair, Cuff Size: Adult Regular)   Pulse 94   Ht 1.676 m (5' 6\")   Wt 79.4 kg (175 lb)   SpO2 99%   BMI 28.25 kg/m      Physical Exam    GENERAL APPEARANCE: healthy, alert and no distress     EYES: EOMI, PERRL     HENT: ear canals and TM's normal and nose and mouth without ulcers or lesions     NECK: no adenopathy, no asymmetry, masses, or scars and thyroid normal to palpation     RESP: lungs clear to auscultation - no rales, rhonchi or wheezes     CV: regular rates and rhythm, normal S1 S2, no S3 or S4 and no murmur, click or rub     ABDOMEN:  soft, nontender, no HSM or masses and bowel sounds normal     MS: extremities normal- no gross deformities noted, no evidence of inflammation in joints, FROM in all extremities.     SKIN: no suspicious lesions or rashes     NEURO: Normal strength and tone, sensory exam grossly normal, mentation intact and speech normal     PSYCH: mentation appears normal. and affect normal/bright     LYMPHATICS: No cervical adenopathy    Diabetic foot exam: normal DP and PT pulses, no trophic changes or ulcerative lesions and normal sensory exam        No results for input(s): HGB, PLT, INR, NA, POTASSIUM, CR, A1C in the last 35744 hours.     Diagnostics:  Recent Results (from the past 24 hour(s))   Hemoglobin A1c    Collection Time: 12/16/20  7:35 AM   Result Value Ref Range    Hemoglobin A1C 12.1 (H) 0 - 5.6 %   Comprehensive metabolic panel (BMP + Alb, Alk Phos, ALT, AST, Total. Bili, TP)    Collection Time: 12/16/20  7:35 AM   Result Value Ref Range    Sodium 138 133 - 144 mmol/L    Potassium 3.7 3.4 - 5.3 mmol/L    Chloride 104 94 - 109 mmol/L    Carbon Dioxide 28 20 - 32 mmol/L    Anion Gap 6 3 - 14 mmol/L    Glucose 264 (H) 70 - 99 mg/dL    Urea Nitrogen 16 7 - 30 mg/dL    Creatinine 0.51 (L) 0.52 - 1.04 mg/dL    GFR Estimate >90 >60 mL/min/[1.73_m2]    GFR Estimate If " Black >90 >60 mL/min/[1.73_m2]    Calcium 9.2 8.5 - 10.1 mg/dL    Bilirubin Total 0.6 0.2 - 1.3 mg/dL    Albumin 4.0 3.4 - 5.0 g/dL    Protein Total 8.1 6.8 - 8.8 g/dL    Alkaline Phosphatase 127 40 - 150 U/L    ALT 29 0 - 50 U/L    AST 19 0 - 45 U/L   Lipid Profile    Collection Time: 12/16/20  7:35 AM   Result Value Ref Range    Cholesterol 191 <200 mg/dL    Triglycerides 59 <150 mg/dL    HDL Cholesterol 94 >49 mg/dL    LDL Cholesterol Calculated 85 <100 mg/dL    Non HDL Cholesterol 97 <130 mg/dL   TSH with free T4 reflex    Collection Time: 12/16/20  7:35 AM   Result Value Ref Range    TSH 2.34 0.40 - 4.00 mU/L   Estimated Average Glucose    Collection Time: 12/16/20  7:35 AM   Result Value Ref Range    Estimated Average Glucose 301 mg/dL   CBC with platelets and differential    Collection Time: 12/16/20  7:35 AM   Result Value Ref Range    WBC 5.1 4.0 - 11.0 10e9/L    RBC Count 5.25 (H) 3.8 - 5.2 10e12/L    Hemoglobin 15.6 11.7 - 15.7 g/dL    Hematocrit 45.0 35.0 - 47.0 %    MCV 86 78 - 100 fl    MCH 29.7 26.5 - 33.0 pg    MCHC 34.7 31.5 - 36.5 g/dL    RDW 12.7 10.0 - 15.0 %    Platelet Count 239 150 - 450 10e9/L    Diff Method Automated Method     % Neutrophils 47.5 %    % Lymphocytes 34.0 %    % Monocytes 7.8 %    % Eosinophils 8.9 %    % Basophils 1.6 %    % Immature Granulocytes 0.2 %    Nucleated RBCs 0 0 /100    Absolute Neutrophil 2.4 1.6 - 8.3 10e9/L    Absolute Lymphocytes 1.8 0.8 - 5.3 10e9/L    Absolute Monocytes 0.4 0.0 - 1.3 10e9/L    Absolute Eosinophils 0.5 0.0 - 0.7 10e9/L    Absolute Basophils 0.1 0.0 - 0.2 10e9/L    Abs Immature Granulocytes 0.0 0 - 0.4 10e9/L    Absolute Nucleated RBC 0.0    Albumin Random Urine Quantitative with Creat Ratio    Collection Time: 12/16/20  7:39 AM   Result Value Ref Range    Creatinine Urine 96 mg/dL    Albumin Urine mg/L 30 mg/L    Albumin Urine mg/g Cr 31.80 (H) 0 - 25 mg/g Cr        EKG: NSR, VR 79, no acute ST changes  [id number]}     Assessment & Plan   The  proposed surgical procedure is considered INTERMEDIATE risk.    Preop general physical exam  Rotator cuff injury, right, initial encounter  A1C 12.1. Will therefore not clear for surgery. She was made aware of the increased risk of infection. Will fax paperwork to Dr. Yuan.     Uncontrolled type 2 diabetes mellitus with hyperglycemia (H)  A1C 12.1. Will refer to the [facility] Center and begin Metformin. She has taken Metformin in the past without side effects. Will also begin ACE and statin. Already taking asa. Encouraged to have eye exam. Will then see back in 4 weeks for reassessment. At this time, will recheck CMP. Will be unable to check A1C as it will not have been 3 months.     - DIABETES EDUCATION REFERRAL ([place])  - metFORMIN (GLUCOPHAGE-XR) 500 MG 24 hr tablet; Take 2 tablets (1,000 mg) by mouth 2 times daily (with meals)  - lisinopril (ZESTRIL) 5 MG tablet; Take 1 tablet (5 mg) by mouth daily    Microalbuminuria  Will begin lisinopril as she has taken this in the past without side effects.     - lisinopril (ZESTRIL) 5 MG tablet; Take 1 tablet (5 mg) by mouth daily    Hyperlipidemia, unspecified hyperlipidemia type  The 10-year ASCVD risk score (Selma VIKY Jr., et al., 2013) is: 2.9%    Values used to calculate the score:      Age: 55 years      Sex: Female      Is Non- : No      Diabetic: Yes      Tobacco smoker: No      Systolic Blood Pressure: 130 mmHg      Is BP treated: No      HDL Cholesterol: 94 mg/dL      Total Cholesterol: 191 mg/dL     Risk 2.9 percent. Will begin moderate intensity statin. She was made aware of the side effects. Will recheck lipids/AST/ALT in 4 weeks. Will notify patient of the results when available and intervene accordingly.     - atorvastatin (LIPITOR) 20 MG tablet; Take 1 tablet (20 mg) by mouth daily      RECOMMENDATION:  Surgery is NOT recommended due to uncontrolled diabetes. A1C 12.1. Stabilization required prior to elective surgery.       Signed  Electronically by: Genesis Falk NP    Copy of this evaluation report is provided to requesting physician.    Preop [facility] Preop Guidelines    Revised Cardiac Risk Index

## 2020-12-14 NOTE — PATIENT INSTRUCTIONS

## 2020-12-16 ENCOUNTER — OFFICE VISIT (OUTPATIENT)
Dept: FAMILY MEDICINE | Facility: OTHER | Age: 55
End: 2020-12-16
Attending: NURSE PRACTITIONER
Payer: COMMERCIAL

## 2020-12-16 ENCOUNTER — APPOINTMENT (OUTPATIENT)
Dept: GENERAL RADIOLOGY | Facility: OTHER | Age: 55
End: 2020-12-16
Attending: NURSE PRACTITIONER
Payer: COMMERCIAL

## 2020-12-16 VITALS
HEIGHT: 66 IN | SYSTOLIC BLOOD PRESSURE: 130 MMHG | HEART RATE: 94 BPM | WEIGHT: 175 LBS | DIASTOLIC BLOOD PRESSURE: 72 MMHG | BODY MASS INDEX: 28.12 KG/M2 | OXYGEN SATURATION: 99 %

## 2020-12-16 DIAGNOSIS — Z11.4 SCREENING FOR HIV (HUMAN IMMUNODEFICIENCY VIRUS): ICD-10-CM

## 2020-12-16 DIAGNOSIS — E78.5 HYPERLIPIDEMIA, UNSPECIFIED HYPERLIPIDEMIA TYPE: ICD-10-CM

## 2020-12-16 DIAGNOSIS — E11.9 TYPE 2 DIABETES MELLITUS WITHOUT COMPLICATION, WITHOUT LONG-TERM CURRENT USE OF INSULIN (H): ICD-10-CM

## 2020-12-16 DIAGNOSIS — Z01.818 PREOP GENERAL PHYSICAL EXAM: Primary | ICD-10-CM

## 2020-12-16 DIAGNOSIS — I10 ESSENTIAL HYPERTENSION: ICD-10-CM

## 2020-12-16 DIAGNOSIS — Z13.220 LIPID SCREENING: ICD-10-CM

## 2020-12-16 DIAGNOSIS — Z11.59 ENCOUNTER FOR HEPATITIS C SCREENING TEST FOR LOW RISK PATIENT: ICD-10-CM

## 2020-12-16 DIAGNOSIS — Z20.822 COVID-19 RULED OUT: Primary | ICD-10-CM

## 2020-12-16 DIAGNOSIS — G60.9 IDIOPATHIC PERIPHERAL NEUROPATHY: ICD-10-CM

## 2020-12-16 DIAGNOSIS — E11.65 UNCONTROLLED TYPE 2 DIABETES MELLITUS WITH HYPERGLYCEMIA (H): ICD-10-CM

## 2020-12-16 DIAGNOSIS — R80.9 MICROALBUMINURIA: ICD-10-CM

## 2020-12-16 DIAGNOSIS — S46.001A ROTATOR CUFF INJURY, RIGHT, INITIAL ENCOUNTER: ICD-10-CM

## 2020-12-16 LAB
ALBUMIN SERPL-MCNC: 4 G/DL (ref 3.4–5)
ALP SERPL-CCNC: 127 U/L (ref 40–150)
ALT SERPL W P-5'-P-CCNC: 29 U/L (ref 0–50)
ANION GAP SERPL CALCULATED.3IONS-SCNC: 6 MMOL/L (ref 3–14)
AST SERPL W P-5'-P-CCNC: 19 U/L (ref 0–45)
BASOPHILS # BLD AUTO: 0.1 10E9/L (ref 0–0.2)
BASOPHILS NFR BLD AUTO: 1.6 %
BILIRUB SERPL-MCNC: 0.6 MG/DL (ref 0.2–1.3)
BUN SERPL-MCNC: 16 MG/DL (ref 7–30)
CALCIUM SERPL-MCNC: 9.2 MG/DL (ref 8.5–10.1)
CHLORIDE SERPL-SCNC: 104 MMOL/L (ref 94–109)
CHOLEST SERPL-MCNC: 191 MG/DL
CO2 SERPL-SCNC: 28 MMOL/L (ref 20–32)
CREAT SERPL-MCNC: 0.51 MG/DL (ref 0.52–1.04)
CREAT UR-MCNC: 96 MG/DL
DIFFERENTIAL METHOD BLD: ABNORMAL
EOSINOPHIL # BLD AUTO: 0.5 10E9/L (ref 0–0.7)
EOSINOPHIL NFR BLD AUTO: 8.9 %
ERYTHROCYTE [DISTWIDTH] IN BLOOD BY AUTOMATED COUNT: 12.7 % (ref 10–15)
EST. AVERAGE GLUCOSE BLD GHB EST-MCNC: 301 MG/DL
GFR SERPL CREATININE-BSD FRML MDRD: >90 ML/MIN/{1.73_M2}
GLUCOSE SERPL-MCNC: 264 MG/DL (ref 70–99)
HBA1C MFR BLD: 12.1 % (ref 0–5.6)
HCT VFR BLD AUTO: 45 % (ref 35–47)
HDLC SERPL-MCNC: 94 MG/DL
HGB BLD-MCNC: 15.6 G/DL (ref 11.7–15.7)
IMM GRANULOCYTES # BLD: 0 10E9/L (ref 0–0.4)
IMM GRANULOCYTES NFR BLD: 0.2 %
LDLC SERPL CALC-MCNC: 85 MG/DL
LYMPHOCYTES # BLD AUTO: 1.8 10E9/L (ref 0.8–5.3)
LYMPHOCYTES NFR BLD AUTO: 34 %
MCH RBC QN AUTO: 29.7 PG (ref 26.5–33)
MCHC RBC AUTO-ENTMCNC: 34.7 G/DL (ref 31.5–36.5)
MCV RBC AUTO: 86 FL (ref 78–100)
MICROALBUMIN UR-MCNC: 30 MG/L
MICROALBUMIN/CREAT UR: 31.8 MG/G CR (ref 0–25)
MONOCYTES # BLD AUTO: 0.4 10E9/L (ref 0–1.3)
MONOCYTES NFR BLD AUTO: 7.8 %
NEUTROPHILS # BLD AUTO: 2.4 10E9/L (ref 1.6–8.3)
NEUTROPHILS NFR BLD AUTO: 47.5 %
NONHDLC SERPL-MCNC: 97 MG/DL
NRBC # BLD AUTO: 0 10*3/UL
NRBC BLD AUTO-RTO: 0 /100
PLATELET # BLD AUTO: 239 10E9/L (ref 150–450)
POTASSIUM SERPL-SCNC: 3.7 MMOL/L (ref 3.4–5.3)
PROT SERPL-MCNC: 8.1 G/DL (ref 6.8–8.8)
RBC # BLD AUTO: 5.25 10E12/L (ref 3.8–5.2)
SODIUM SERPL-SCNC: 138 MMOL/L (ref 133–144)
TRIGL SERPL-MCNC: 59 MG/DL
TSH SERPL DL<=0.005 MIU/L-ACNC: 2.34 MU/L (ref 0.4–4)
WBC # BLD AUTO: 5.1 10E9/L (ref 4–11)

## 2020-12-16 PROCEDURE — U0003 INFECTIOUS AGENT DETECTION BY NUCLEIC ACID (DNA OR RNA); SEVERE ACUTE RESPIRATORY SYNDROME CORONAVIRUS 2 (SARS-COV-2) (CORONAVIRUS DISEASE [COVID-19]), AMPLIFIED PROBE TECHNIQUE, MAKING USE OF HIGH THROUGHPUT TECHNOLOGIES AS DESCRIBED BY CMS-2020-01-R: HCPCS | Performed by: NURSE PRACTITIONER

## 2020-12-16 PROCEDURE — 99214 OFFICE O/P EST MOD 30 MIN: CPT | Mod: 25 | Performed by: NURSE PRACTITIONER

## 2020-12-16 PROCEDURE — 80050 GENERAL HEALTH PANEL: CPT | Performed by: NURSE PRACTITIONER

## 2020-12-16 PROCEDURE — 87389 HIV-1 AG W/HIV-1&-2 AB AG IA: CPT | Performed by: NURSE PRACTITIONER

## 2020-12-16 PROCEDURE — 83036 HEMOGLOBIN GLYCOSYLATED A1C: CPT | Performed by: NURSE PRACTITIONER

## 2020-12-16 PROCEDURE — 86803 HEPATITIS C AB TEST: CPT | Performed by: NURSE PRACTITIONER

## 2020-12-16 PROCEDURE — 36415 COLL VENOUS BLD VENIPUNCTURE: CPT | Performed by: NURSE PRACTITIONER

## 2020-12-16 PROCEDURE — 93000 ELECTROCARDIOGRAM COMPLETE: CPT | Performed by: INTERNAL MEDICINE

## 2020-12-16 PROCEDURE — 99207 PR NO CHARGE NURSE ONLY: CPT

## 2020-12-16 PROCEDURE — 82043 UR ALBUMIN QUANTITATIVE: CPT | Performed by: NURSE PRACTITIONER

## 2020-12-16 PROCEDURE — 80061 LIPID PANEL: CPT | Performed by: NURSE PRACTITIONER

## 2020-12-16 PROCEDURE — 86618 LYME DISEASE ANTIBODY: CPT | Performed by: NURSE PRACTITIONER

## 2020-12-16 RX ORDER — LISINOPRIL 5 MG/1
5 TABLET ORAL DAILY
Qty: 90 TABLET | Refills: 3 | Status: SHIPPED | OUTPATIENT
Start: 2020-12-16 | End: 2022-10-28

## 2020-12-16 RX ORDER — METFORMIN HCL 500 MG
1000 TABLET, EXTENDED RELEASE 24 HR ORAL 2 TIMES DAILY WITH MEALS
Qty: 120 TABLET | Refills: 0 | Status: SHIPPED | OUTPATIENT
Start: 2020-12-16 | End: 2021-01-13

## 2020-12-16 RX ORDER — ATORVASTATIN CALCIUM 20 MG/1
20 TABLET, FILM COATED ORAL DAILY
Qty: 90 TABLET | Refills: 3 | Status: SHIPPED | OUTPATIENT
Start: 2020-12-16 | End: 2021-01-18

## 2020-12-16 ASSESSMENT — MIFFLIN-ST. JEOR: SCORE: 1395.54

## 2020-12-16 ASSESSMENT — PAIN SCALES - GENERAL: PAINLEVEL: NO PAIN (0)

## 2020-12-16 NOTE — NURSING NOTE
"Chief Complaint   Patient presents with     Pre-Op Exam     R shoulder Dr. Yuan        Initial /72 (BP Location: Right arm, Patient Position: Chair, Cuff Size: Adult Regular)   Pulse 94   Ht 1.676 m (5' 6\")   Wt 79.4 kg (175 lb)   SpO2 99%   BMI 28.25 kg/m   Estimated body mass index is 28.25 kg/m  as calculated from the following:    Height as of this encounter: 1.676 m (5' 6\").    Weight as of this encounter: 79.4 kg (175 lb).  Medication Reconciliation: complete  Vicki Hernandez LPN  "

## 2020-12-17 LAB
B BURGDOR IGG+IGM SER QL: 0.13 (ref 0–0.89)
HCV AB SERPL QL IA: NONREACTIVE
HIV 1+2 AB+HIV1 P24 AG SERPL QL IA: NONREACTIVE
LABORATORY COMMENT REPORT: NORMAL
SARS-COV-2 RNA SPEC QL NAA+PROBE: NEGATIVE
SARS-COV-2 RNA SPEC QL NAA+PROBE: NORMAL
SPECIMEN SOURCE: NORMAL
SPECIMEN SOURCE: NORMAL

## 2021-01-12 NOTE — PROGRESS NOTES
Assessment & Plan   (E11.65) Uncontrolled type 2 diabetes mellitus with hyperglycemia (H)  (primary encounter diagnosis)  Plan: Tolerating the Metformin without side effects. Will continue. A1C was over 12 four weeks ago. She also [facility] with the [facility] later today. May need a second agent as her A1C was so high. On asa. BP at goal. Encouraged to make eye exam. Will see her in 2 months as we can then recheck an A1C.     (R80.9) Microalbuminuria  Comment: on ACE  Plan: Will continue    (E78.5) Hyperlipidemia, unspecified hyperlipidemia type  Plan: , LDL 96. Needs to be on low dose statin d/t her DM. She tried taking Lipitor one dose and one foot swelled. Unsure if Lipitor actually caused this. She was encouraged to give it another try. If foot swelling persists, she will follow up in the clinic. Will then recheck lipids/CMP in 2 months.     The 10-year ASCVD risk score (Chevy Chasenancy SALMON Jr., et al., 2013) is: 3.5%    Values used to calculate the score:      Age: 55 years      Sex: Female      Is Non- : No      Diabetic: Yes      Tobacco smoker: No      Systolic Blood Pressure: 118 mmHg      Is BP treated: Yes      HDL Cholesterol: 92 mg/dL      Total Cholesterol: 203 mg/dL      (I10) Essential hypertension  Plan: Well controlled. Continue current medications. Encouraged daily exercise and a low sodium diet. Recommended checking BP's 2x/wk, call the clinic if consistantly s>140 or d>90. Follow up in 6 months.     (Z12.11) Screening for colon cancer  Plan: COLOGLEXY([employer])        Will notify patient of the results when available and intervene accordingly.              Genesis Falk NP  [facility] - BARRERA Suazo is a 55 year old who presents to clinic today for the following health issues     HPI       Diabetes Follow-up      How often are you checking your blood sugar? Not at all    What concerns do you have today about your diabetes? None     Do you  have any of these symptoms? (Select all that apply)  Tingling in feet     Have you had a diabetic eye exam in the last 12 months? No     Patient was seen for a pre-op on 12/16/20. At this time, an A1C was checked and it was 12.1. She had stopped her Metformin several months prior. Unsure why. This was restarted and she was referred to the [facility] to discuss beginning an injectable. She has an appointment with them today.     On asa.     Statin restarted at out previous visit. She took one dose and feels her foot swelled. Willing to try again.     Denies chest pain, shortness of breath, dizziness, syncope, or palpitations.     H/O microalbuminuria, on ACE.     Diabetic Foot Screen:  Any complaints of increased pain or numbness ? No  Is there a foot ulcer now or a history of foot ulcer? No  Does the foot have an abnormal shape? No  Are the nails thick, too long or ingrown? No  Are there any redness or open areas? No         Sensation Testing done at all points on the diagram with monofilament     Right Foot: Sensation Normal at all points  Left Foot: Sensation Normal at all points     Risk Category: 0- No loss of protective sensation  Performed by Genesis Falk NP      {Reference  Diabetes Management Resources :456780}        Hyperlipidemia Follow-Up      Are you regularly taking any medication or supplement to lower your cholesterol?   No, only took one dose    Are you having muscle aches or other side effects that you think could be caused by your cholesterol lowering medication?  No , quit taking the Lipitor as her feet swelled. Only took one dose, willing to restart.    As noted above, she denies chest pain, shortness of breath, dizziness, syncope, or palpitations.    Also on asa.     Hypertension Follow-up      Do you check your blood pressure regularly outside of the clinic? No     Are you following a low salt diet? No    Are your blood pressures ever more than 140 on the top number (systolic) OR  "more   than 90 on the bottom number (diastolic), for example 140/90? No   -Taking lisinopril 5 mg. No side effects.   -As noted above, she denies chest pain, shortness of breath, dizziness, syncope, or palpitations.      BP Readings from Last 2 Encounters:   01/13/21 118/80   12/16/20 130/72     Hemoglobin A1C (%)   Date Value   12/16/2020 12.1 (H)     LDL Cholesterol Calculated (mg/dL)   Date Value   12/16/2020 85         How many servings of fruits and vegetables do you eat daily?  2-3    On average, how many sweetened beverages do you drink each day (Examples: soda, juice, sweet tea, etc.  Do NOT count diet or artificially sweetened beverages)?   0    How many days per week do you exercise enough to make your heart beat faster? 4    How many minutes a day do you exercise enough to make your heart beat faster? 30 - 60    How many days per week do you miss taking your medication? 0 , stopped the Lipitor       Review of Systems   As noted in the HPI.       Objective    /80 (BP Location: Right arm, Patient Position: Chair, Cuff Size: Adult Regular)   Pulse 91   Temp 97.7  F (36.5  C) (Tympanic)   Ht 1.676 m (5' 6\")   Wt 79.2 kg (174 lb 9.6 oz)   SpO2 95%   BMI 28.18 kg/m    Body mass index is 28.18 kg/m .  Physical Exam   GENERAL: healthy, alert and no distress  EYES: Eyes grossly normal to inspection, PERRL and conjunctivae and sclerae normal  HENT: ear canals and TM's normal, nose and mouth without ulcers or lesions  NECK: no adenopathy, no asymmetry, masses, or scars and thyroid normal to palpation  RESP: lungs clear to auscultation - no rales, rhonchi or wheezes  CV: regular rate and rhythm, normal S1 S2, no S3 or S4, no murmur, click or rub, no peripheral edema and peripheral pulses strong  NEURO: Normal strength and tone, mentation intact and speech normal  PSYCH: mentation appears normal, affect normal/bright  Diabetic foot exam: normal DP and PT pulses, no trophic changes or ulcerative lesions and " normal sensory exam    Results for orders placed or performed in visit on 01/13/21 (from the past 24 hour(s))   Lipid Profile   Result Value Ref Range    Cholesterol 203 (H) <200 mg/dL    Triglycerides 76 <150 mg/dL    HDL Cholesterol 92 >49 mg/dL    LDL Cholesterol Calculated 96 <100 mg/dL    Non HDL Cholesterol 111 <130 mg/dL   Comprehensive metabolic panel (BMP + Alb, Alk Phos, ALT, AST, Total. Bili, TP)   Result Value Ref Range    Sodium 138 133 - 144 mmol/L    Potassium 3.8 3.4 - 5.3 mmol/L    Chloride 103 94 - 109 mmol/L    Carbon Dioxide 30 20 - 32 mmol/L    Anion Gap 5 3 - 14 mmol/L    Glucose 235 (H) 70 - 99 mg/dL    Urea Nitrogen 19 7 - 30 mg/dL    Creatinine 0.58 0.52 - 1.04 mg/dL    GFR Estimate >90 >60 mL/min/[1.73_m2]    GFR Estimate If Black >90 >60 mL/min/[1.73_m2]    Calcium 9.6 8.5 - 10.1 mg/dL    Bilirubin Total 0.4 0.2 - 1.3 mg/dL    Albumin 3.9 3.4 - 5.0 g/dL    Protein Total 8.1 6.8 - 8.8 g/dL    Alkaline Phosphatase 120 40 - 150 U/L    ALT 31 0 - 50 U/L    AST 18 0 - 45 U/L

## 2021-01-13 ENCOUNTER — TELEPHONE (OUTPATIENT)
Dept: FAMILY MEDICINE | Facility: OTHER | Age: 56
End: 2021-01-13

## 2021-01-13 ENCOUNTER — HOSPITAL ENCOUNTER (OUTPATIENT)
Dept: EDUCATION SERVICES | Facility: HOSPITAL | Age: 56
End: 2021-01-13
Attending: NURSE PRACTITIONER
Payer: COMMERCIAL

## 2021-01-13 ENCOUNTER — OFFICE VISIT (OUTPATIENT)
Dept: FAMILY MEDICINE | Facility: OTHER | Age: 56
End: 2021-01-13
Attending: NURSE PRACTITIONER
Payer: COMMERCIAL

## 2021-01-13 VITALS
TEMPERATURE: 97.7 F | DIASTOLIC BLOOD PRESSURE: 80 MMHG | HEIGHT: 66 IN | SYSTOLIC BLOOD PRESSURE: 118 MMHG | OXYGEN SATURATION: 95 % | BODY MASS INDEX: 28.06 KG/M2 | WEIGHT: 174.6 LBS | HEART RATE: 91 BPM

## 2021-01-13 VITALS
HEART RATE: 94 BPM | RESPIRATION RATE: 16 BRPM | SYSTOLIC BLOOD PRESSURE: 136 MMHG | HEIGHT: 66 IN | WEIGHT: 176.8 LBS | BODY MASS INDEX: 28.42 KG/M2 | DIASTOLIC BLOOD PRESSURE: 85 MMHG | OXYGEN SATURATION: 97 %

## 2021-01-13 DIAGNOSIS — Z12.11 SCREENING FOR COLON CANCER: ICD-10-CM

## 2021-01-13 DIAGNOSIS — R80.9 MICROALBUMINURIA: ICD-10-CM

## 2021-01-13 DIAGNOSIS — E78.5 HYPERLIPIDEMIA, UNSPECIFIED HYPERLIPIDEMIA TYPE: ICD-10-CM

## 2021-01-13 DIAGNOSIS — E11.65 UNCONTROLLED TYPE 2 DIABETES MELLITUS WITH HYPERGLYCEMIA (H): ICD-10-CM

## 2021-01-13 DIAGNOSIS — E11.65 TYPE 2 DIABETES MELLITUS WITH HYPERGLYCEMIA, WITHOUT LONG-TERM CURRENT USE OF INSULIN (H): Primary | ICD-10-CM

## 2021-01-13 DIAGNOSIS — I10 ESSENTIAL HYPERTENSION: ICD-10-CM

## 2021-01-13 DIAGNOSIS — E11.65 UNCONTROLLED TYPE 2 DIABETES MELLITUS WITH HYPERGLYCEMIA (H): Primary | ICD-10-CM

## 2021-01-13 LAB
ALBUMIN SERPL-MCNC: 3.9 G/DL (ref 3.4–5)
ALP SERPL-CCNC: 120 U/L (ref 40–150)
ALT SERPL W P-5'-P-CCNC: 31 U/L (ref 0–50)
ANION GAP SERPL CALCULATED.3IONS-SCNC: 5 MMOL/L (ref 3–14)
AST SERPL W P-5'-P-CCNC: 18 U/L (ref 0–45)
BILIRUB SERPL-MCNC: 0.4 MG/DL (ref 0.2–1.3)
BUN SERPL-MCNC: 19 MG/DL (ref 7–30)
CALCIUM SERPL-MCNC: 9.6 MG/DL (ref 8.5–10.1)
CHLORIDE SERPL-SCNC: 103 MMOL/L (ref 94–109)
CHOLEST SERPL-MCNC: 203 MG/DL
CO2 SERPL-SCNC: 30 MMOL/L (ref 20–32)
CREAT SERPL-MCNC: 0.58 MG/DL (ref 0.52–1.04)
GFR SERPL CREATININE-BSD FRML MDRD: >90 ML/MIN/{1.73_M2}
GLUCOSE SERPL-MCNC: 235 MG/DL (ref 70–99)
HDLC SERPL-MCNC: 92 MG/DL
LDLC SERPL CALC-MCNC: 96 MG/DL
NONHDLC SERPL-MCNC: 111 MG/DL
POTASSIUM SERPL-SCNC: 3.8 MMOL/L (ref 3.4–5.3)
PROT SERPL-MCNC: 8.1 G/DL (ref 6.8–8.8)
SODIUM SERPL-SCNC: 138 MMOL/L (ref 133–144)
TRIGL SERPL-MCNC: 76 MG/DL

## 2021-01-13 PROCEDURE — 80061 LIPID PANEL: CPT | Performed by: NURSE PRACTITIONER

## 2021-01-13 PROCEDURE — 80053 COMPREHEN METABOLIC PANEL: CPT | Performed by: NURSE PRACTITIONER

## 2021-01-13 PROCEDURE — G0108 DIAB MANAGE TRN  PER INDIV: HCPCS | Performed by: DIETITIAN, REGISTERED

## 2021-01-13 PROCEDURE — 99214 OFFICE O/P EST MOD 30 MIN: CPT | Performed by: NURSE PRACTITIONER

## 2021-01-13 PROCEDURE — 36415 COLL VENOUS BLD VENIPUNCTURE: CPT | Performed by: NURSE PRACTITIONER

## 2021-01-13 ASSESSMENT — MIFFLIN-ST. JEOR
SCORE: 1393.73
SCORE: 1403.71

## 2021-01-13 ASSESSMENT — PAIN SCALES - GENERAL
PAINLEVEL: NO PAIN (0)
PAINLEVEL: NO PAIN (1)

## 2021-01-13 NOTE — NURSING NOTE
"Chief Complaint   Patient presents with     Diabetes     Hypertension     Lipids       Initial /80 (BP Location: Right arm, Patient Position: Chair, Cuff Size: Adult Regular)   Pulse 91   Temp 97.7  F (36.5  C) (Tympanic)   Ht 1.676 m (5' 6\")   Wt 79.2 kg (174 lb 9.6 oz)   SpO2 95%   BMI 28.18 kg/m   Estimated body mass index is 28.18 kg/m  as calculated from the following:    Height as of this encounter: 1.676 m (5' 6\").    Weight as of this encounter: 79.2 kg (174 lb 9.6 oz).  Medication Reconciliation: complete  Vicki Hernandez LPN  "

## 2021-01-13 NOTE — LETTER
"    1/13/2021        RE: Lakeisha Herman  [address]  [place] [address]        Diabetes Self-Management Education & Support    Presents for: Individual review    SUBJECTIVE/OBJECTIVE:  Presents for: Individual review  Accompanied by: Self  Diabetes education in the past 24mo: No  Focus of Visit: Assistance w/ making life changes  Diabetes type: Type 2  Date of diagnosis: Pt unsure  Disease course: Worsening  How confident are you filling out medical forms by yourself:: Extremely  Diabetes management related comments/concerns: Wants to lower glucose levels and get back on track.  Transportation concerns: No  Difficulty affording diabetes medication?: No  Difficulty affording diabetes testing supplies?: No  Other concerns:: None  Cultural Influences/Ethnic Background:  American    Diabetes Symptoms & Complications:  Fatigue: Yes  Neuropathy: Yes  Polydipsia: Yes  Polyphagia: No  Polyuria: No  Visual change: No  Slow healing wounds: No  Symptom course: Worsening  Weight trend: Decreasing(Weight down 60# in past 5 years.)  Complications assessed today?: Yes  CVA: No  Heart disease: No  Nephropathy: No    Patient Problem List and Family Medical History reviewed for relevant medical history, current medical status, and diabetes risk factors.    Vitals:  /85   Pulse 94   Resp 16   Ht 1.676 m (5' 6\")   Wt 80.2 kg (176 lb 12.8 oz)   SpO2 97%   BMI 28.54 kg/m    Estimated body mass index is 28.54 kg/m  as calculated from the following:    Height as of this encounter: 1.676 m (5' 6\").    Weight as of this encounter: 80.2 kg (176 lb 12.8 oz).   Last 3 BP:   BP Readings from Last 3 Encounters:   01/13/21 136/85   01/13/21 118/80   12/16/20 130/72       History   Smoking Status     Never Smoker   Smokeless Tobacco     Never Used       Labs:  Lab Results   Component Value Date    A1C 12.1 12/16/2020     Lab Results   Component Value Date     01/13/2021     Lab Results   Component Value Date    LDL 96 " 01/13/2021     HDL Cholesterol   Date Value Ref Range Status   01/13/2021 92 >49 mg/dL Final   ]  GFR Estimate   Date Value Ref Range Status   01/13/2021 >90 >60 mL/min/[1.73_m2] Final     Comment:     Non  GFR Calc  Starting 12/18/2018, serum creatinine based estimated GFR (eGFR) will be   calculated using the Chronic Kidney Disease Epidemiology Collaboration   (CKD-EPI) equation.       GFR Estimate If Black   Date Value Ref Range Status   01/13/2021 >90 >60 mL/min/[1.73_m2] Final     Comment:      GFR Calc  Starting 12/18/2018, serum creatinine based estimated GFR (eGFR) will be   calculated using the Chronic Kidney Disease Epidemiology Collaboration   (CKD-EPI) equation.       Lab Results   Component Value Date    CR 0.58 01/13/2021     No results found for: MICROALBUMIN    Healthy Eating:  Healthy Eating Assessed Today: Yes  Cultural/Orthodox diet restrictions?: No  Meal planning/habits: Avoiding sweets, Low carb  Meals include: Breakfast, Lunch, Dinner  Breakfast: None  Lunch: soup or leftovers or sandwich or salad/chicken at [employer] or [employer] burger - diet soda or coffee  Dinner: was getting home late and eating late but quit one job so now will not be working late  Snacks: nuts, fruit, weakness for sweets but has been working on cutting back  Other: Was eating out 4+ x per week but will be less now that she is down to one job  Beverages: Diet soda, Coffee, Water  Has patient met with a dietitian in the past?: No    Being Active:  Being Active Assessed Today: Yes  Exercise:: Currently not exercising  Barrier to exercise: None    Monitoring:  Monitoring Assessed Today: Yes  Did patient bring glucose meter to appointment? : No(Pt does not have a meter.)    Taking Medications:  Diabetes Medication(s)     Biguanides       metFORMIN (GLUCOPHAGE) 1000 MG tablet    Take 1 tablet (1,000 mg) by mouth 2 times daily (with meals)        Taking Medication Assessed Today: Yes  Current  Treatments: Diet, Oral Medication (taken by mouth)(Metformin 1000 mg bid.)  Problems taking diabetes medications regularly?: No  Diabetes medication side effects?: No    Problem Solving:  Problem Solving Assessed Today: Yes  Is the patient at risk for hypoglycemia?: No  Is the patient at risk for DKA?: No    Reducing Risks:  Reducing Risks Assessed Today: Yes  Diabetes Risks: Age over 45 years, Family History  Additional female risks: (Pt did have baby >9#.)  CAD Risks: Diabetes Mellitus, Obesity  Has dilated eye exam at least once a year?: No(Will schedule)  Sees dentist every 6 months?: No  Feet checked by healthcare provider in the last year?: Yes    Healthy Coping:  Healthy Coping Assessed Today: Yes  Emotional response to diabetes: Ready to learn, Acceptance, Concern for health and well-being  Informal Support system:: Family  Stage of change: ACTION (Actively working towards change)  Support resources: None  Patient Activation Measure Survey Score:  DERIAN Score (Last Two) 8/12/2020   DERIAN Raw Score 33   Activation Score 65.8   DERIAN Level 3     Diabetes knowledge and skills assessment:   Patient is knowledgeable in diabetes management concepts related to: Pt has remote hx of diabetes education - benefits from review in all areas.     Patient needs further education on the following diabetes management concepts: Healthy Eating, Being Active, Monitoring, Taking Medication, Problem Solving, Reducing Risks and Healthy Coping    Based on learning assessment above, most appropriate setting for further diabetes education would be: Individual setting.      INTERVENTIONS:    Education provided today on:  AADE Self-Care Behaviors:  Diabetes Pathophysiology  Healthy Eating: carbohydrate counting, consistency in amount, composition, and timing of food intake, weight reduction, portion control and label reading  Being Active: relationship to blood glucose  Monitoring: purpose, proper technique, individual blood glucose targets,  frequency of monitoring and proper sharps disposal  Taking Medication: action of prescribed medication  Patient was instructed on Contour Next One meter and was able to provide an accurate return demonstration. Patient's blood glucose reading today was 330 mg/dL - 1 hour post breakfast.    Opportunities for ongoing education and support in diabetes-self management were discussed.    Pt verbalized understanding of concepts discussed and recommendations provided today.       Education Materials Provided:  Type 2 Diabetes Basics  My Food Plan  Fast Food Guide  [employer] Contour Next One meter    ASSESSMENT:  Pt is here today for diabetes education.  She has remote hx of education.  Recent A1c 12.1%.  Pt states she would like to get back on track with her diabetes care.  She listened attentively and participated well during session.      Patient's most recent   Lab Results   Component Value Date    A1C 12.1 12/16/2020    is not meeting goal of <7.0    PLAN  Follow healthy, low carbohydrate meal plan - 45 grams/meal, 15-30 grams/snack.   Try to get some routine exercise.   Test glucose 3x/day- fasting, before supper and 2 hours after supper.  Keep taking current dose of Metformin.  Discussed additional medication may be indicated if levels do not improve.   Bring meter to follow up session.  See Patient Instructions for co-developed, patient-stated behavior change goals.  AVS printed and provided to patient today.   Follow up in 2 weeks.     Time Spent: 70 minutes  Encounter Type: Individual    Any diabetes medication dose changes were made via the CDE Protocol and Collaborative Practice Agreement with the patient's referring provider. A copy of this encounter was shared with the provider.          Sincerely,        Roro Armenta RD

## 2021-01-13 NOTE — PATIENT INSTRUCTIONS
-Follow low carbohydrate meal plan - 45 grams/meal, 15-30 grams/snack.   -Try to get some daily exercise.  Goal is 30 minutes most days of the week.  -Test glucose 3x/day.  Good times are fasting, before supper and 2 hours after supper.  -Target levels are fasting and before supper , 2 hours after supper less than 180.   -Keep taking current dose of Metformin.   -Bring your meter to follow up visit.  -Follow up in 2 weeks.   -Call with any concerns - MANINDER Valdovinos, -[phone].

## 2021-01-13 NOTE — PROGRESS NOTES
"Diabetes Self-Management Education & Support    Presents for: Individual review    SUBJECTIVE/OBJECTIVE:  Presents for: Individual review  Accompanied by: Self  Diabetes education in the past 24mo: No  Focus of Visit: Assistance w/ making life changes  Diabetes type: Type 2  Date of diagnosis: Pt unsure  Disease course: Worsening  How confident are you filling out medical forms by yourself:: Extremely  Diabetes management related comments/concerns: Wants to lower glucose levels and get back on track.  Transportation concerns: No  Difficulty affording diabetes medication?: No  Difficulty affording diabetes testing supplies?: No  Other concerns:: None  Cultural Influences/Ethnic Background:  American    Diabetes Symptoms & Complications:  Fatigue: Yes  Neuropathy: Yes  Polydipsia: Yes  Polyphagia: No  Polyuria: No  Visual change: No  Slow healing wounds: No  Symptom course: Worsening  Weight trend: Decreasing(Weight down 60# in past 5 years.)  Complications assessed today?: Yes  CVA: No  Heart disease: No  Nephropathy: No    Patient Problem List and Family Medical History reviewed for relevant medical history, current medical status, and diabetes risk factors.    Vitals:  /85   Pulse 94   Resp 16   Ht 1.676 m (5' 6\")   Wt 80.2 kg (176 lb 12.8 oz)   SpO2 97%   BMI 28.54 kg/m    Estimated body mass index is 28.54 kg/m  as calculated from the following:    Height as of this encounter: 1.676 m (5' 6\").    Weight as of this encounter: 80.2 kg (176 lb 12.8 oz).   Last 3 BP:   BP Readings from Last 3 Encounters:   01/13/21 136/85   01/13/21 118/80   12/16/20 130/72       History   Smoking Status     Never Smoker   Smokeless Tobacco     Never Used       Labs:  Lab Results   Component Value Date    A1C 12.1 12/16/2020     Lab Results   Component Value Date     01/13/2021     Lab Results   Component Value Date    LDL 96 01/13/2021     HDL Cholesterol   Date Value Ref Range Status   01/13/2021 92 >49 mg/dL " Final   ]  GFR Estimate   Date Value Ref Range Status   01/13/2021 >90 >60 mL/min/[1.73_m2] Final     Comment:     Non  GFR Calc  Starting 12/18/2018, serum creatinine based estimated GFR (eGFR) will be   calculated using the Chronic Kidney Disease Epidemiology Collaboration   (CKD-EPI) equation.       GFR Estimate If Black   Date Value Ref Range Status   01/13/2021 >90 >60 mL/min/[1.73_m2] Final     Comment:      GFR Calc  Starting 12/18/2018, serum creatinine based estimated GFR (eGFR) will be   calculated using the Chronic Kidney Disease Epidemiology Collaboration   (CKD-EPI) equation.       Lab Results   Component Value Date    CR 0.58 01/13/2021     No results found for: MICROALBUMIN    Healthy Eating:  Healthy Eating Assessed Today: Yes  Cultural/Scientology diet restrictions?: No  Meal planning/habits: Avoiding sweets, Low carb  Meals include: Breakfast, Lunch, Dinner  Breakfast: None  Lunch: soup or leftovers or sandwich or salad/chicken at [employer] or [email or web address] burger - diet soda or coffee  Dinner: was getting home late and eating late but quit one job so now will not be working late  Snacks: nuts, fruit, weakness for sweets but has been working on cutting back  Other: Was eating out 4+ x per week but will be less now that she is down to one job  Beverages: Diet soda, Coffee, Water  Has patient met with a dietitian in the past?: No    Being Active:  Being Active Assessed Today: Yes  Exercise:: Currently not exercising  Barrier to exercise: None    Monitoring:  Monitoring Assessed Today: Yes  Did patient bring glucose meter to appointment? : No(Pt does not have a meter.)    Taking Medications:  Diabetes Medication(s)     Biguanides       metFORMIN (GLUCOPHAGE) 1000 MG tablet    Take 1 tablet (1,000 mg) by mouth 2 times daily (with meals)        Taking Medication Assessed Today: Yes  Current Treatments: Diet, Oral Medication (taken by mouth)(Metformin 1000 mg bid.)  Problems  taking diabetes medications regularly?: No  Diabetes medication side effects?: No    Problem Solving:  Problem Solving Assessed Today: Yes  Is the patient at risk for hypoglycemia?: No  Is the patient at risk for DKA?: No    Reducing Risks:  Reducing Risks Assessed Today: Yes  Diabetes Risks: Age over 45 years, Family History  Additional female risks: (Pt did have baby >9#.)  CAD Risks: Diabetes Mellitus, Obesity  Has dilated eye exam at least once a year?: No(Will schedule)  Sees dentist every 6 months?: No  Feet checked by healthcare provider in the last year?: Yes    Healthy Coping:  Healthy Coping Assessed Today: Yes  Emotional response to diabetes: Ready to learn, Acceptance, Concern for health and well-being  Informal Support system:: Family  Stage of change: ACTION (Actively working towards change)  Support resources: None  Patient Activation Measure Survey Score:  DERIAN Score (Last Two) 8/12/2020   DERIAN Raw Score 33   Activation Score 65.8   DERIAN Level 3     Diabetes knowledge and skills assessment:   Patient is knowledgeable in diabetes management concepts related to: Pt has remote hx of diabetes education - benefits from review in all areas.     Patient needs further education on the following diabetes management concepts: Healthy Eating, Being Active, Monitoring, Taking Medication, Problem Solving, Reducing Risks and Healthy Coping    Based on learning assessment above, most appropriate setting for further diabetes education would be: Individual setting.      INTERVENTIONS:    Education provided today on:  AADE Self-Care Behaviors:  Diabetes Pathophysiology  Healthy Eating: carbohydrate counting, consistency in amount, composition, and timing of food intake, weight reduction, portion control and label reading  Being Active: relationship to blood glucose  Monitoring: purpose, proper technique, individual blood glucose targets, frequency of monitoring and proper sharps disposal  Taking Medication: action of  prescribed medication  Patient was instructed on Contour Next One meter and was able to provide an accurate return demonstration. Patient's blood glucose reading today was 330 mg/dL - 1 hour post breakfast.    Opportunities for ongoing education and support in diabetes-self management were discussed.    Pt verbalized understanding of concepts discussed and recommendations provided today.       Education Materials Provided:  Type 2 Diabetes Basics  My Food Plan  Fast Food Guide  [employer] Contour Next One meter    ASSESSMENT:  Pt is here today for diabetes education.  She has remote hx of education.  Recent A1c 12.1%.  Pt states she would like to get back on track with her diabetes care.  She listened attentively and participated well during session.      Patient's most recent   Lab Results   Component Value Date    A1C 12.1 12/16/2020    is not meeting goal of <7.0    PLAN  Follow healthy, low carbohydrate meal plan - 45 grams/meal, 15-30 grams/snack.   Try to get some routine exercise.   Test glucose 3x/day- fasting, before supper and 2 hours after supper.  Keep taking current dose of Metformin.  Discussed additional medication may be indicated if levels do not improve.   Bring meter to follow up session.  See Patient Instructions for co-developed, patient-stated behavior change goals.  AVS printed and provided to patient today.   Follow up in 2 weeks.     Time Spent: 70 minutes  Encounter Type: Individual    Any diabetes medication dose changes were made via the CDE Protocol and Collaborative Practice Agreement with the patient's referring provider. A copy of this encounter was shared with the provider.

## 2021-01-13 NOTE — TELEPHONE ENCOUNTER
Pt would like a call back in regards to a medication that has to do with her cholesterol if it needs to be changed. She can be reached at [phone].

## 2021-01-13 NOTE — TELEPHONE ENCOUNTER
Spoke with patient . She has not started the Lipitor yet and is wondering with her new readings if she still needs to start it ?

## 2021-01-18 ENCOUNTER — MYC MEDICAL ADVICE (OUTPATIENT)
Dept: FAMILY MEDICINE | Facility: OTHER | Age: 56
End: 2021-01-18

## 2021-01-18 DIAGNOSIS — E78.5 HYPERLIPIDEMIA, UNSPECIFIED HYPERLIPIDEMIA TYPE: Primary | ICD-10-CM

## 2021-01-18 RX ORDER — PRAVASTATIN SODIUM 10 MG
10 TABLET ORAL DAILY
Qty: 90 TABLET | Refills: 0 | Status: SHIPPED | OUTPATIENT
Start: 2021-01-18 | End: 2022-09-03

## 2021-02-08 ENCOUNTER — HOSPITAL ENCOUNTER (OUTPATIENT)
Dept: EDUCATION SERVICES | Facility: HOSPITAL | Age: 56
Discharge: HOME | End: 2021-02-08
Attending: NURSE PRACTITIONER | Admitting: NURSE PRACTITIONER
Payer: COMMERCIAL

## 2021-02-08 ENCOUNTER — TELEPHONE (OUTPATIENT)
Dept: EDUCATION SERVICES | Facility: HOSPITAL | Age: 56
End: 2021-02-08

## 2021-02-08 VITALS
OXYGEN SATURATION: 98 % | WEIGHT: 179.8 LBS | DIASTOLIC BLOOD PRESSURE: 86 MMHG | SYSTOLIC BLOOD PRESSURE: 123 MMHG | RESPIRATION RATE: 16 BRPM | HEART RATE: 91 BPM | HEIGHT: 66 IN | BODY MASS INDEX: 28.9 KG/M2

## 2021-02-08 DIAGNOSIS — E11.65 TYPE 2 DIABETES MELLITUS WITH HYPERGLYCEMIA, WITHOUT LONG-TERM CURRENT USE OF INSULIN (H): Primary | ICD-10-CM

## 2021-02-08 PROCEDURE — G0108 DIAB MANAGE TRN  PER INDIV: HCPCS | Performed by: DIETITIAN, REGISTERED

## 2021-02-08 ASSESSMENT — MIFFLIN-ST. JEOR: SCORE: 1417.32

## 2021-02-08 ASSESSMENT — PAIN SCALES - GENERAL: PAINLEVEL: NO PAIN (1)

## 2021-02-08 NOTE — LETTER
"    2/8/2021        RE: Lakeisha Herman  [address]  [place] [address]        Diabetes Self-Management Education & Support    Presents for: Individual review    SUBJECTIVE/OBJECTIVE:  Presents for: Individual review  Accompanied by: Self  Diabetes education in the past 24mo: Yes  Focus of Visit: Assistance w/ making life changes, Taking Medication  Diabetes type: Type 2  Date of diagnosis: Pt unsure - at least 10 years ago.  Disease course: Worsening  How confident are you filling out medical forms by yourself:: Extremely  Diabetes management related comments/concerns: Wants to lower glucose levels and get back on track.  Transportation concerns: No  Difficulty affording diabetes medication?: No  Difficulty affording diabetes testing supplies?: No  Other concerns:: None  Cultural Influences/Ethnic Background:  American    Diabetes Symptoms & Complications:  Fatigue: Yes  Neuropathy: Yes  Polydipsia: Yes  Polyphagia: No  Polyuria: No  Visual change: No  Slow healing wounds: No  Symptom course: Worsening  Weight trend: Decreasing(Weight down 60# in past 5 years.)  Complications assessed today?: No  CVA: No  Heart disease: No  Nephropathy: No    Patient Problem List and Family Medical History reviewed for relevant medical history, current medical status, and diabetes risk factors.    Vitals:  /86   Pulse 91   Resp 16   Ht 1.676 m (5' 6\")   Wt 81.6 kg (179 lb 12.8 oz)   SpO2 98%   BMI 29.02 kg/m    Estimated body mass index is 29.02 kg/m  as calculated from the following:    Height as of this encounter: 1.676 m (5' 6\").    Weight as of this encounter: 81.6 kg (179 lb 12.8 oz).   Last 3 BP:   BP Readings from Last 3 Encounters:   02/08/21 123/86   01/13/21 136/85   01/13/21 118/80       History   Smoking Status     Never Smoker   Smokeless Tobacco     Never Used       Labs:  Lab Results   Component Value Date    A1C 12.1 12/16/2020     Lab Results   Component Value Date     01/13/2021     Lab " Results   Component Value Date    LDL 96 01/13/2021     HDL Cholesterol   Date Value Ref Range Status   01/13/2021 92 >49 mg/dL Final   ]  GFR Estimate   Date Value Ref Range Status   01/13/2021 >90 >60 mL/min/[1.73_m2] Final     Comment:     Non  GFR Calc  Starting 12/18/2018, serum creatinine based estimated GFR (eGFR) will be   calculated using the Chronic Kidney Disease Epidemiology Collaboration   (CKD-EPI) equation.       GFR Estimate If Black   Date Value Ref Range Status   01/13/2021 >90 >60 mL/min/[1.73_m2] Final     Comment:      GFR Calc  Starting 12/18/2018, serum creatinine based estimated GFR (eGFR) will be   calculated using the Chronic Kidney Disease Epidemiology Collaboration   (CKD-EPI) equation.       Lab Results   Component Value Date    CR 0.58 01/13/2021     No results found for: MICROALBUMIN    Healthy Eating:  Healthy Eating Assessed Today: Yes  Cultural/Rastafari diet restrictions?: No  Meal planning/habits: Avoiding sweets, Carb counting  Meals include: Breakfast, Lunch, Dinner, Evening Snack  Breakfast: Sausage yaquelin with egg from [employer]- coffee or diet coke  Lunch: meat and cheese or soup or salad - water or diet coke or coffee or skim milk  Dinner: hamburger helper portion control - considering doing meal prep ahead of time  Snacks: popcorn with parmesan cheese hs - yogurt in am, pickles  Beverages: Diet soda, Coffee, Water  Has patient met with a dietitian in the past?: Yes    Being Active:  Being Active Assessed Today: Yes  Exercise:: Currently not exercising  Barrier to exercise: None    Monitoring:  Monitoring Assessed Today: Yes  Did patient bring glucose meter to appointment? : Yes(Pt does not have a meter.)  Blood Glucose Meter: ContourNext  Times checking blood sugar at home (number): 2  Times checking blood sugar at home (per): Day  Fasting-237, 230, 216, 265  Before lunch-170  Before supper-153  Evening-221    Taking Medications:  Diabetes  Medication(s)     Biguanides       metFORMIN (GLUCOPHAGE) 1000 MG tablet    Take 1 tablet (1,000 mg) by mouth 2 times daily (with meals)    Sodium-Glucose Co-Transporter 2 (SGLT2) Inhibitors       empagliflozin (JARDIANCE) 10 MG TABS tablet    Take 1 tablet (10 mg) by mouth daily          Taking Medication Assessed Today: Yes  Current Treatments: Diet, Oral Medication (taken by mouth)(Metformin 1000 mg bid.)  Problems taking diabetes medications regularly?: No  Diabetes medication side effects?: No    Problem Solving:  Problem Solving Assessed Today: Yes  Is the patient at risk for hypoglycemia?: No  Is the patient at risk for DKA?: No    Reducing Risks:  Reducing Risks Assessed Today: No  Diabetes Risks: Age over 45 years, Family History  Additional female risks: (Pt did have baby >9#.)  CAD Risks: Diabetes Mellitus, Obesity  Has dilated eye exam at least once a year?: No  Sees dentist every 6 months?: No  Feet checked by healthcare provider in the last year?: Yes    Healthy Coping:  Healthy Coping Assessed Today: Yes  Emotional response to diabetes: Ready to learn, Acceptance, Concern for health and well-being  Informal Support system:: Family  Stage of change: ACTION (Actively working towards change)  Support resources: None  Patient Activation Measure Survey Score:  DERIAN Score (Last Two) 8/12/2020   DERIAN Raw Score 33   Activation Score 65.8   DERIAN Level 3       Diabetes knowledge and skills assessment:   Patient is knowledgeable in diabetes management concepts related to: Healthy Eating, Monitoring and Taking Medication, Healthy Coping.     Patient needs further education on the following diabetes management concepts: Being Active, Problem Solving and Reducing Risks    Based on learning assessment above, most appropriate setting for further diabetes education would be: Individual setting.      INTERVENTIONS:    Education provided today on:  AADE Self-Care Behaviors:  Taking Medication: Reviewed additional  medication options.   Reducing Risks: major complications of diabetes and A1C - goals, relating to blood glucose levels, how often to check    Opportunities for ongoing education and support in diabetes-self management were discussed.    Pt verbalized understanding of concepts discussed and recommendations provided today.       Education Materials Provided:  New Gengo Microlet Lancing Device      ASSESSMENT:  Pt is doing very well with limiting foods high in carbohydrates in her diet.  She is frustrated with continued high glucose levels.  She is agreeable to additional medication to lower levels.        Patient's most recent   Lab Results   Component Value Date    A1C 12.1 12/16/2020    is not meeting goal of <7.0    PLAN  Continue efforts to follow low carbohydrate meal plan.  Try to get some routine exercise.   Test glucose 2-3x/day - fasting, before supper/2 hours after supper.  Continue current dose of Metformin.   Will begin Jardiance 10 mg daily - provider in agreement.   Call with any side effects.   See Patient Instructions for co-developed, patient-stated behavior change goals.  AVS printed and provided to patient today.   Follow up in one month or sooner if concerns arise.     Time Spent: 45 minutes  Encounter Type: Individual    Any diabetes medication dose changes were made via the CDE Protocol and Collaborative Practice Agreement with the patient's referring provider. A copy of this encounter was shared with the provider.          Sincerely,        Roro Armenta RD

## 2021-02-08 NOTE — TELEPHONE ENCOUNTER
Pt was here today for diabetes follow up visit.  She is taking Metformin 1000 mg bid and has made positive changes to her diet.  Unfortunately most glucose levels still the 200's.  Okay to start Jardiance 10 mg daily and titrate dose as needed?  Thanks!

## 2021-02-08 NOTE — PATIENT INSTRUCTIONS
-Keep following low carbohydrate meal plan.   -Try to add some routine exercise.   -Test glucose 2-3x/day.  Good times are fasting, before supper, 2 hours after supper.   -Target levels are fasting before supper , 2 hours after supper < 180.  -Keep taking your current dose of Metformin.   -I will call you regarding new medication Jardiance.  Your dose will be 10 mg in am.   -Follow up in one month.  -Call with any concerns - MANINDER Valdovinos, [phone].

## 2021-02-09 NOTE — PROGRESS NOTES
"Diabetes Self-Management Education & Support    Presents for: Individual review    SUBJECTIVE/OBJECTIVE:  Presents for: Individual review  Accompanied by: Self  Diabetes education in the past 24mo: Yes  Focus of Visit: Assistance w/ making life changes, Taking Medication  Diabetes type: Type 2  Date of diagnosis: Pt unsure - at least 10 years ago.  Disease course: Worsening  How confident are you filling out medical forms by yourself:: Extremely  Diabetes management related comments/concerns: Wants to lower glucose levels and get back on track.  Transportation concerns: No  Difficulty affording diabetes medication?: No  Difficulty affording diabetes testing supplies?: No  Other concerns:: None  Cultural Influences/Ethnic Background:  American    Diabetes Symptoms & Complications:  Fatigue: Yes  Neuropathy: Yes  Polydipsia: Yes  Polyphagia: No  Polyuria: No  Visual change: No  Slow healing wounds: No  Symptom course: Worsening  Weight trend: Decreasing(Weight down 60# in past 5 years.)  Complications assessed today?: No  CVA: No  Heart disease: No  Nephropathy: No    Patient Problem List and Family Medical History reviewed for relevant medical history, current medical status, and diabetes risk factors.    Vitals:  /86   Pulse 91   Resp 16   Ht 1.676 m (5' 6\")   Wt 81.6 kg (179 lb 12.8 oz)   SpO2 98%   BMI 29.02 kg/m    Estimated body mass index is 29.02 kg/m  as calculated from the following:    Height as of this encounter: 1.676 m (5' 6\").    Weight as of this encounter: 81.6 kg (179 lb 12.8 oz).   Last 3 BP:   BP Readings from Last 3 Encounters:   02/08/21 123/86   01/13/21 136/85   01/13/21 118/80       History   Smoking Status     Never Smoker   Smokeless Tobacco     Never Used       Labs:  Lab Results   Component Value Date    A1C 12.1 12/16/2020     Lab Results   Component Value Date     01/13/2021     Lab Results   Component Value Date    LDL 96 01/13/2021     HDL Cholesterol   Date Value " Ref Range Status   01/13/2021 92 >49 mg/dL Final   ]  GFR Estimate   Date Value Ref Range Status   01/13/2021 >90 >60 mL/min/[1.73_m2] Final     Comment:     Non  GFR Calc  Starting 12/18/2018, serum creatinine based estimated GFR (eGFR) will be   calculated using the Chronic Kidney Disease Epidemiology Collaboration   (CKD-EPI) equation.       GFR Estimate If Black   Date Value Ref Range Status   01/13/2021 >90 >60 mL/min/[1.73_m2] Final     Comment:      GFR Calc  Starting 12/18/2018, serum creatinine based estimated GFR (eGFR) will be   calculated using the Chronic Kidney Disease Epidemiology Collaboration   (CKD-EPI) equation.       Lab Results   Component Value Date    CR 0.58 01/13/2021     No results found for: MICROALBUMIN    Healthy Eating:  Healthy Eating Assessed Today: Yes  Cultural/Religion diet restrictions?: No  Meal planning/habits: Avoiding sweets, Carb counting  Meals include: Breakfast, Lunch, Dinner, Evening Snack  Breakfast: Sausage yaquelin with egg from [employer]- coffee or diet coke  Lunch: meat and cheese or soup or salad - water or diet coke or coffee or skim milk  Dinner: hamburger helper portion control - considering doing meal prep ahead of time  Snacks: popcorn with parmesan cheese hs - yogurt in am, pickles  Beverages: Diet soda, Coffee, Water  Has patient met with a dietitian in the past?: Yes    Being Active:  Being Active Assessed Today: Yes  Exercise:: Currently not exercising  Barrier to exercise: None    Monitoring:  Monitoring Assessed Today: Yes  Did patient bring glucose meter to appointment? : Yes(Pt does not have a meter.)  Blood Glucose Meter: ContourNext  Times checking blood sugar at home (number): 2  Times checking blood sugar at home (per): Day  Fasting-237, 230, 216, 265  Before lunch-170  Before supper-153  Evening-221    Taking Medications:  Diabetes Medication(s)     Biguanides       metFORMIN (GLUCOPHAGE) 1000 MG tablet    Take 1  tablet (1,000 mg) by mouth 2 times daily (with meals)    Sodium-Glucose Co-Transporter 2 (SGLT2) Inhibitors       empagliflozin (JARDIANCE) 10 MG TABS tablet    Take 1 tablet (10 mg) by mouth daily          Taking Medication Assessed Today: Yes  Current Treatments: Diet, Oral Medication (taken by mouth)(Metformin 1000 mg bid.)  Problems taking diabetes medications regularly?: No  Diabetes medication side effects?: No    Problem Solving:  Problem Solving Assessed Today: Yes  Is the patient at risk for hypoglycemia?: No  Is the patient at risk for DKA?: No    Reducing Risks:  Reducing Risks Assessed Today: No  Diabetes Risks: Age over 45 years, Family History  Additional female risks: (Pt did have baby >9#.)  CAD Risks: Diabetes Mellitus, Obesity  Has dilated eye exam at least once a year?: No  Sees dentist every 6 months?: No  Feet checked by healthcare provider in the last year?: Yes    Healthy Coping:  Healthy Coping Assessed Today: Yes  Emotional response to diabetes: Ready to learn, Acceptance, Concern for health and well-being  Informal Support system:: Family  Stage of change: ACTION (Actively working towards change)  Support resources: None  Patient Activation Measure Survey Score:  DERIAN Score (Last Two) 8/12/2020   DERIAN Raw Score 33   Activation Score 65.8   DERIAN Level 3       Diabetes knowledge and skills assessment:   Patient is knowledgeable in diabetes management concepts related to: Healthy Eating, Monitoring and Taking Medication, Healthy Coping.     Patient needs further education on the following diabetes management concepts: Being Active, Problem Solving and Reducing Risks    Based on learning assessment above, most appropriate setting for further diabetes education would be: Individual setting.      INTERVENTIONS:    Education provided today on:  AADE Self-Care Behaviors:  Taking Medication: Reviewed additional medication options.   Reducing Risks: major complications of diabetes and A1C - goals,  relating to blood glucose levels, how often to check    Opportunities for ongoing education and support in diabetes-self management were discussed.    Pt verbalized understanding of concepts discussed and recommendations provided today.       Education Materials Provided:  New Buzz360 Microlet Lancing Device      ASSESSMENT:  Pt is doing very well with limiting foods high in carbohydrates in her diet.  She is frustrated with continued high glucose levels.  She is agreeable to additional medication to lower levels.        Patient's most recent   Lab Results   Component Value Date    A1C 12.1 12/16/2020    is not meeting goal of <7.0    PLAN  Continue efforts to follow low carbohydrate meal plan.  Try to get some routine exercise.   Test glucose 2-3x/day - fasting, before supper/2 hours after supper.  Continue current dose of Metformin.   Will begin Jardiance 10 mg daily - provider in agreement.   Call with any side effects.   See Patient Instructions for co-developed, patient-stated behavior change goals.  AVS printed and provided to patient today.   Follow up in one month or sooner if concerns arise.     Time Spent: 45 minutes  Encounter Type: Individual    Any diabetes medication dose changes were made via the CDE Protocol and Collaborative Practice Agreement with the patient's referring provider. A copy of this encounter was shared with the provider.

## 2021-04-18 ENCOUNTER — HEALTH MAINTENANCE LETTER (OUTPATIENT)
Age: 56
End: 2021-04-18

## 2021-08-08 ENCOUNTER — HEALTH MAINTENANCE LETTER (OUTPATIENT)
Age: 56
End: 2021-08-08

## 2021-09-11 ENCOUNTER — HOSPITAL ENCOUNTER (EMERGENCY)
Facility: HOSPITAL | Age: 56
Discharge: HOME | End: 2021-09-11
Attending: NURSE PRACTITIONER | Admitting: NURSE PRACTITIONER
Payer: COMMERCIAL

## 2021-09-11 VITALS
BODY MASS INDEX: 29.03 KG/M2 | OXYGEN SATURATION: 97 % | SYSTOLIC BLOOD PRESSURE: 127 MMHG | RESPIRATION RATE: 18 BRPM | WEIGHT: 185 LBS | DIASTOLIC BLOOD PRESSURE: 76 MMHG | HEART RATE: 86 BPM | HEIGHT: 67 IN | TEMPERATURE: 97.5 F

## 2021-09-11 DIAGNOSIS — H66.92 LEFT OTITIS MEDIA: Primary | ICD-10-CM

## 2021-09-11 DIAGNOSIS — H66.92 LEFT OTITIS MEDIA, UNSPECIFIED OTITIS MEDIA TYPE: ICD-10-CM

## 2021-09-11 PROCEDURE — G0463 HOSPITAL OUTPT CLINIC VISIT: HCPCS

## 2021-09-11 PROCEDURE — 99213 OFFICE O/P EST LOW 20 MIN: CPT | Performed by: NURSE PRACTITIONER

## 2021-09-11 RX ORDER — CEFDINIR 300 MG/1
300 CAPSULE ORAL 2 TIMES DAILY
Qty: 10 CAPSULE | Refills: 0 | Status: SHIPPED | OUTPATIENT
Start: 2021-09-11 | End: 2021-09-16

## 2021-09-11 ASSESSMENT — ENCOUNTER SYMPTOMS
SORE THROAT: 1
CHILLS: 0
SHORTNESS OF BREATH: 0
EYE PAIN: 0
HEADACHES: 1
VOMITING: 0
SINUS PAIN: 1
PSYCHIATRIC NEGATIVE: 1
EYE ITCHING: 0
SINUS PRESSURE: 1
NAUSEA: 0
EYE REDNESS: 0
COUGH: 0
DIARRHEA: 0
FEVER: 0
MYALGIAS: 0

## 2021-09-11 ASSESSMENT — MIFFLIN-ST. JEOR: SCORE: 1451.78

## 2021-09-11 NOTE — Clinical Note
Lakeisha Herman was seen and treated in our emergency department on 9/11/2021.  She may return to work on 09/12/2021.       If you have any questions or concerns, please don't hesitate to call.      Fozia Valerio, NP

## 2021-09-11 NOTE — ED PROVIDER NOTES
History     Chief Complaint   Patient presents with     Otalgia     HPI  Lakeisha Herman is a 56 year old female who presents to urgent care today (ambulatory) with complaints of congestion, ear pain, sinus pain and pressure, sore throat and headache.  Denies any hearing loss or tinnitus.  Ear pain nset this past Thursday 9/9/2021.  Pain 4/10 and has been taking APAP.  No seasonal allergy history.  Nonsmoker.  No asthma/COPD.  No medication or treatments attempted.  No history of COVID positive infection.  Has Moderna covid vaccine series completed 3/11/2021.  Declines COVID Test. No other concerns.     Allergies:  Allergies   Allergen Reactions     Pcn [Penicillins] Rash       Problem List:    Patient Active Problem List    Diagnosis Date Noted     Left knee pain 03/09/2016     Priority: Medium     Obesity 03/13/2014     Priority: Medium     Overview:   Updated per 10/1/17 IMO import       Fibroids 09/02/2013     Priority: Medium     Diabetes mellitus, type 2 (H) 11/06/2007     Priority: Medium     Overview:   HGA1C      7.8   5/29/2014  Goal: <7.0        Hypertension 10/23/2007     Priority: Medium     Allergic rhinitis 09/05/2007     Priority: Medium      Past Medical History:    No past medical history on file.    Past Surgical History:    Past Surgical History:   Procedure Laterality Date     cyst removed      neck     XR WRIST SURGERY JACY LEFT      unsure specifics       Family History:    Family History   Problem Relation Age of Onset     Hypoglycemia Mother      Cerebrovascular Disease Maternal Grandmother      Dementia Maternal Grandmother        Social History:  Marital Status:   [4]  Social History     Tobacco Use     Smoking status: Never Smoker     Smokeless tobacco: Never Used   Substance Use Topics     Alcohol use: Not on file     Drug use: Not on file        Medications:    cefdinir (OMNICEF) 300 MG capsule  aspirin (ASA) 81 MG EC tablet  blood glucose (CONTOUR NEXT TEST) test strip  blood  "glucose monitoring (KARY MICROLET) lancets  empagliflozin (JARDIANCE) 10 MG TABS tablet  lisinopril (ZESTRIL) 5 MG tablet  metFORMIN (GLUCOPHAGE) 1000 MG tablet  pravastatin (PRAVACHOL) 10 MG tablet      Review of Systems   Constitutional: Negative for chills and fever.   HENT: Positive for congestion, ear pain, sinus pressure, sinus pain and sore throat.    Eyes: Negative for pain, redness and itching.   Respiratory: Negative for cough and shortness of breath.    Cardiovascular: Negative for chest pain.   Gastrointestinal: Negative for diarrhea, nausea and vomiting.   Musculoskeletal: Negative for myalgias.   Skin: Negative for rash.   Neurological: Positive for headaches.   Psychiatric/Behavioral: Negative.      Physical Exam   BP: 127/76  Pulse: 86  Temp: 97.5  F (36.4  C)  Resp: 18  Height: 170.2 cm (5' 7\")  Weight: 83.9 kg (185 lb)  SpO2: 97 %    Physical Exam  Vitals and nursing note reviewed.   HENT:      Head: Normocephalic.      Right Ear: Tympanic membrane, ear canal and external ear normal.      Left Ear: No decreased hearing noted. Tympanic membrane is injected and erythematous.      Nose: Congestion and rhinorrhea present.      Mouth/Throat:      Mouth: Mucous membranes are moist.      Pharynx: Oropharynx is clear. Posterior oropharyngeal erythema present.   Eyes:      Extraocular Movements: Extraocular movements intact.      Conjunctiva/sclera: Conjunctivae normal.      Pupils: Pupils are equal, round, and reactive to light.   Cardiovascular:      Rate and Rhythm: Normal rate and regular rhythm.      Pulses: Normal pulses.      Heart sounds: Normal heart sounds.   Pulmonary:      Effort: Pulmonary effort is normal.      Breath sounds: Normal breath sounds.   Abdominal:      General: Bowel sounds are normal.      Palpations: Abdomen is soft.   Musculoskeletal:      Cervical back: Normal range of motion and neck supple. No rigidity or tenderness.   Neurological:      Mental Status: She is alert. "       ED Course     No results found for this or any previous visit (from the past 24 hour(s)).    Medications - No data to display    Assessments & Plan (with Medical Decision Making)     I have reviewed the nursing notes.    I have reviewed the findings, diagnosis, plan and need for follow up with the patient.  (H66.92) Left otitis media  Plan:   Cefdinir as ordered  - Take entire course of antibiotic even if you start to feel better.  - Antibiotics can cause stomach upset including nausea and diarrhea. Read your bottle or ask the pharmacist if antibiotic can be taken with food to help prevent nausea. If you have symptoms of diarrhea you can take an over-the-counter probiotic and/or increase foods with probiotics such as yogurt, [place], sauerkraut.    OTC Ear pain relief drops as needed    Symptomatic treatments recommended.  - Ensure you are staying hydrated by drinking plenty of fluids or eating foods such as popsicles, jello, pudding.  - Honey can be soothing for sore throat  - Warm salt water gurgles can help soothe sore throat  - Rest  - Humidifier can help with congestion and help keep mucus membranes such as throat and nose from drying out.  - Sleeping slightly propped up can help with congestion and postnasal drainage that can worsen cough at bedtime.  - As long as you have never been told to take Tylenol and/or Ibuprofen you can use them to manage fever and body aches per package instructions  Make sure you eat when you take ibuprofen to avoid stomach upset.  - OTC cough medications per package instructions to help with cough. Check to see if the cough/cold medication already has acetaminophen (Tylenol) in it. If it does avoid taking additional Tylenol.  - If sudden onset of new fever, worsening symptoms return for further evaluation.  - OTC nasal steroid such as Flonase can help decrease sinus inflammation to help with congestion.    Follow up with primary care provider or return to urgent care/ED with  any worsening in condition or additional concerns.     New Prescriptions    CEFDINIR (OMNICEF) 300 MG CAPSULE    Take 1 capsule (300 mg) by mouth 2 times daily for 5 days     Final diagnoses:   Left otitis media     9/11/2021   [facility]     Fozia Valerio NP  09/11/21 2046

## 2021-09-11 NOTE — DISCHARGE INSTRUCTIONS
Cefdinir as ordered  - Take entire course of antibiotic even if you start to feel better.  - Antibiotics can cause stomach upset including nausea and diarrhea. Read your bottle or ask the pharmacist if antibiotic can be taken with food to help prevent nausea. If you have symptoms of diarrhea you can take an over-the-counter probiotic and/or increase foods with probiotics such as yogurt, [place], sauerkraut.    OTC Ear pain relief drops as needed    Symptomatic treatments recommended.  - Ensure you are staying hydrated by drinking plenty of fluids or eating foods such as popsicles, jello, pudding.  - Honey can be soothing for sore throat  - Warm salt water gurgles can help soothe sore throat  - Rest  - Humidifier can help with congestion and help keep mucus membranes such as throat and nose from drying out.  - Sleeping slightly propped up can help with congestion and postnasal drainage that can worsen cough at bedtime.  - As long as you have never been told to take Tylenol and/or Ibuprofen you can use them to manage fever and body aches per package instructions  Make sure you eat when you take ibuprofen to avoid stomach upset.  - OTC cough medications per package instructions to help with cough. Check to see if the cough/cold medication already has acetaminophen (Tylenol) in it. If it does avoid taking additional Tylenol.  - If sudden onset of new fever, worsening symptoms return for further evaluation.  - OTC nasal steroid such as Flonase can help decrease sinus inflammation to help with congestion.    Follow up with primary care provider or return to urgent care/ED with any worsening in condition or additional concerns.

## 2021-10-03 ENCOUNTER — HEALTH MAINTENANCE LETTER (OUTPATIENT)
Age: 56
End: 2021-10-03

## 2021-11-23 ENCOUNTER — HOSPITAL ENCOUNTER (EMERGENCY)
Facility: HOSPITAL | Age: 56
Discharge: HOME | End: 2021-11-23
Attending: NURSE PRACTITIONER | Admitting: NURSE PRACTITIONER
Payer: COMMERCIAL

## 2021-11-23 VITALS
OXYGEN SATURATION: 97 % | RESPIRATION RATE: 18 BRPM | SYSTOLIC BLOOD PRESSURE: 157 MMHG | TEMPERATURE: 98 F | DIASTOLIC BLOOD PRESSURE: 96 MMHG | HEART RATE: 93 BPM

## 2021-11-23 DIAGNOSIS — J06.9 VIRAL URI WITH COUGH: ICD-10-CM

## 2021-11-23 PROCEDURE — 99213 OFFICE O/P EST LOW 20 MIN: CPT | Performed by: NURSE PRACTITIONER

## 2021-11-23 PROCEDURE — G0463 HOSPITAL OUTPT CLINIC VISIT: HCPCS

## 2021-11-23 RX ORDER — BENZONATATE 100 MG/1
100 CAPSULE ORAL 3 TIMES DAILY PRN
Qty: 20 CAPSULE | Refills: 0 | Status: SHIPPED | OUTPATIENT
Start: 2021-11-23 | End: 2022-09-25

## 2021-11-23 NOTE — LETTER
[facility]  [address] [address]  Phone: [phone]    November 23, 2021        Lakeisha Herman  [address]   [facility] [address]          To whom it may concern:    RE: Lakeisha Herman    Patient was seen and treated today at our facility.    Please contact me for questions or concerns.      Sincerely,        MATTIE Chaidez CNP on 11/23/2021 at 8:06 PM

## 2021-11-24 NOTE — ED TRIAGE NOTES
Pt presents with c/o dry cough. Denies other sx. Sx started Saturday night. Reports she got booster on Friday. Had aches in her arm Saturday morning and cough started Saturday night. Arms are no longer achy. Pt does not want a covid test at this time. Pt has been taking ibuprofen.

## 2021-11-24 NOTE — DISCHARGE INSTRUCTIONS
If any worsening symptoms, seek immediate medical care.      Symptomatic treatments such as those listed below are recommended as indicated:  Take OTC motrin or tylenol as directed on packaging - as needed and as able based on medical history.   Try sleeping upright/reclined as needed, have cool mist humidifier, increased water intake, rest and wash hands often.     May try OTC cold medicine as directed on bottle - check ingredients, many are multi symptom and may contain tylenol or motrin. Oral decongestants are used cautiously in individuals with heart conditions (high blood pressure).     For nasal symptoms: Use a high flow nasal saline irrigation, like the Luis F Med Sinus Bottle. This can be used every 2-3 hours as needed to help with the nasal congestion. For thick nasal drainage, drink water and take as needed Mucinex. Ibuprofen and/or a nasal steroid will help with pain/inflammation. An over the counter nasal steroid is fluticasone (Flonase) that should be used once daily, 2 prays into each nostril.    For cough: Increased water intake. Warm liquids (coffee, tea, or warm lemon water) with 2-3 teaspoons of honey every couple hours helps calm down the cough. You can also use an over the counter cough suppressant as needed during the day (Delsym), however, if you feel you have a lot of drainage to cough up, this is not recommended as we want you to get rid of that.

## 2021-11-24 NOTE — ED TRIAGE NOTES
Patient presents with concerns about not feeling well after receiving her booster on Friday.  States she has had a cough and body aches.

## 2021-11-24 NOTE — ED PROVIDER NOTES
History     Chief Complaint   Patient presents with     Cough     Generalized Body Aches     HPI  Lakeisha Herman is a 56 year old female who presents with a 3 day history of dry cough. 4 day ago she received her COVID booster in which she woke up with a sore arm the next morning, however, that has since resolved. Cough does keep her up at night. Denies any wheezing, shortness of breath, fevers, chills, nasal congestion, rhinorrhea, PND, rash.     Home treatment: ibuprofen    Allergies:  Allergies   Allergen Reactions     Pcn [Penicillins] Rash       Problem List:    Patient Active Problem List    Diagnosis Date Noted     Left knee pain 03/09/2016     Priority: Medium     Obesity 03/13/2014     Priority: Medium     Overview:   Updated per 10/1/17 IMO import       Fibroids 09/02/2013     Priority: Medium     Diabetes mellitus, type 2 (H) 11/06/2007     Priority: Medium     Overview:   HGA1C      7.8   5/29/2014  Goal: <7.0        Hypertension 10/23/2007     Priority: Medium     Allergic rhinitis 09/05/2007     Priority: Medium        Past Medical History:    History reviewed. No pertinent past medical history.    Past Surgical History:    Past Surgical History:   Procedure Laterality Date     cyst removed      neck     XR WRIST SURGERY JACY LEFT      unsure specifics       Family History:    Family History   Problem Relation Age of Onset     Hypoglycemia Mother      Cerebrovascular Disease Maternal Grandmother      Dementia Maternal Grandmother        Social History:  Marital Status:   [4]  Social History     Tobacco Use     Smoking status: Never Smoker     Smokeless tobacco: Never Used   Substance Use Topics     Alcohol use: None     Drug use: None        Medications:    benzonatate (TESSALON) 100 MG capsule  aspirin (ASA) 81 MG EC tablet  blood glucose (CONTOUR NEXT TEST) test strip  blood glucose monitoring (KARY MICROLET) lancets  empagliflozin (JARDIANCE) 10 MG TABS tablet  lisinopril (ZESTRIL) 5 MG  tablet  metFORMIN (GLUCOPHAGE) 1000 MG tablet  pravastatin (PRAVACHOL) 10 MG tablet          Review of Systems   Constitutional: Negative for appetite change, chills, fatigue and fever.   HENT: Negative for congestion, ear pain, postnasal drip, rhinorrhea, sore throat and trouble swallowing.    Respiratory: Positive for cough. Negative for chest tightness, shortness of breath and wheezing.    Cardiovascular: Negative.    Gastrointestinal: Negative for abdominal pain.   Musculoskeletal:        Arm soreness where injection was administered has since resolved   Skin: Negative.        Physical Exam   BP: 157/96  Pulse: 93  Temp: 98  F (36.7  C)  Resp: 18  SpO2: 97 %      Physical Exam  Vitals and nursing note reviewed.   Constitutional:       General: She is not in acute distress.     Appearance: Normal appearance.   HENT:      Head: Normocephalic and atraumatic.      Right Ear: Tympanic membrane normal.      Nose: Nose normal.      Mouth/Throat:      Mouth: Mucous membranes are moist.      Pharynx: Oropharynx is clear.   Eyes:      Extraocular Movements: Extraocular movements intact.      Conjunctiva/sclera: Conjunctivae normal.   Cardiovascular:      Rate and Rhythm: Normal rate and regular rhythm.   Pulmonary:      Effort: Pulmonary effort is normal.      Breath sounds: Normal breath sounds.   Musculoskeletal:      Cervical back: Normal range of motion and neck supple. No rigidity.   Lymphadenopathy:      Cervical: No cervical adenopathy.   Skin:     General: Skin is warm and dry.      Capillary Refill: Capillary refill takes less than 2 seconds.   Neurological:      General: No focal deficit present.      Mental Status: She is alert and oriented to person, place, and time.   Psychiatric:         Mood and Affect: Mood normal.         ED Course       No results found for this or any previous visit (from the past 24 hour(s)).    Medications - No data to display    Assessments & Plan (with Medical Decision Making)     I  have reviewed the nursing notes.  I have reviewed the findings, diagnosis, plan and need for follow up with the patient.    ICD-10-CM    1. Viral URI with cough  J06.9      3 days of dry cough following COVID booster. Afebrile, no respiratory distress, lungs clear. No indications for antibiotics. Likely common symptoms following vaccination vs other viral etiology. She defers COVID testing. Reassurance given. Symptomatic cares recommended. Good handwashing/quarantine until symptoms resolve.   Benzonatate PRN night time, and during the day as needed for dry cough.     If any worsening symptoms, seek immediate medical care.      Symptomatic treatments such as those listed below are recommended as indicated:  Take OTC motrin or tylenol as directed on packaging - as needed and as able based on medical history.   Try sleeping upright/reclined as needed, have cool mist humidifier, increased water intake, rest and wash hands often.     May try OTC cold medicine as directed on bottle - check ingredients, many are multi symptom and may contain tylenol or motrin. Oral decongestants are used cautiously in individuals with heart conditions (high blood pressure).     For nasal symptoms: Use a high flow nasal saline irrigation, like the Luis F Med Sinus Bottle. This can be used every 2-3 hours as needed to help with the nasal congestion. For thick nasal drainage, drink water and take as needed Mucinex. Ibuprofen and/or a nasal steroid will help with pain/inflammation. An over the counter nasal steroid is fluticasone (Flonase) that should be used once daily, 2 prays into each nostril.    For cough: Increased water intake. Warm liquids (coffee, tea, or warm lemon water) with 2-3 teaspoons of honey every couple hours helps calm down the cough. You can also use an over the counter cough suppressant as needed during the day (Delsym), however, if you feel you have a lot of drainage to cough up, this is not recommended as we want you to  get rid of that.     She agrees with plan of care and verbalizes understanding.       Discharge Medication List as of 11/23/2021  8:06 PM      START taking these medications    Details   benzonatate (TESSALON) 100 MG capsule Take 1 capsule (100 mg) by mouth 3 times daily as needed for cough, Disp-20 capsule, R-0, E-Prescribe             Final diagnoses:   Viral URI with cough       11/23/2021   [facility]     Ginger Mcgill, MATTIE CNP  11/29/21 [id number]

## 2021-11-26 ENCOUNTER — TELEPHONE (OUTPATIENT)
Dept: NURSING | Facility: CLINIC | Age: 56
End: 2021-11-26

## 2021-11-26 NOTE — TELEPHONE ENCOUNTER
Outbound call attempt made to patient with update regarding administered Moderna COVID booster.     No message left, additional attempt will be made to reach patient.     If patient returns call, patient should await return call from call center.     Leatha Weaver RN November 26, 2021 3:38 PM

## 2021-11-27 NOTE — TELEPHONE ENCOUNTER
Outbound call attempt made to patient with update regarding administered Moderna COVID booster.     No message left, additional attempt will be made to reach patient.     If patient returns call, patient should await return call from call center.     Zena Zepeda RN November 26, 2021 8:31 PM

## 2021-11-28 ENCOUNTER — HEALTH MAINTENANCE LETTER (OUTPATIENT)
Age: 56
End: 2021-11-28

## 2021-11-28 NOTE — TELEPHONE ENCOUNTER
Outbound call attempt made to patient with update regarding administered Moderna COVID booster.     No message left, additional attempt will be made to reach patient.     If patient returns call, patient should await return call from call center.     Zena Zepeda RN November 27, 2021 8:47 PM

## 2021-11-28 NOTE — TELEPHONE ENCOUNTER
Outbound call attempt made to patient with update regarding administered Moderna COVID booster.     No message left, additional attempt will be made to reach patient.     If patient returns call, patient should await return call from call center.     Zena Zepeda RN November 28, 2021 4:50 PM

## 2021-11-29 ASSESSMENT — ENCOUNTER SYMPTOMS
CHILLS: 0
RHINORRHEA: 0
ABDOMINAL PAIN: 0
CARDIOVASCULAR NEGATIVE: 1
FEVER: 0
FATIGUE: 0
CHEST TIGHTNESS: 0
APPETITE CHANGE: 0
SHORTNESS OF BREATH: 0
WHEEZING: 0
SORE THROAT: 0
COUGH: 1
TROUBLE SWALLOWING: 0

## 2021-11-29 NOTE — TELEPHONE ENCOUNTER
Outbound call attempt made to patient with update regarding administered Moderna COVID booster.     No message left, additional attempt will be made to reach patient.     If patient returns call, patient should await return call from call center.     Zean Zepeda RN November 28, 2021 6:41 PM

## 2022-03-19 ENCOUNTER — HEALTH MAINTENANCE LETTER (OUTPATIENT)
Age: 57
End: 2022-03-19

## 2022-05-14 ENCOUNTER — HEALTH MAINTENANCE LETTER (OUTPATIENT)
Age: 57
End: 2022-05-14

## 2022-07-09 ENCOUNTER — HEALTH MAINTENANCE LETTER (OUTPATIENT)
Age: 57
End: 2022-07-09

## 2022-09-03 ENCOUNTER — HOSPITAL ENCOUNTER (EMERGENCY)
Facility: HOSPITAL | Age: 57
Discharge: HOME | End: 2022-09-03
Attending: NURSE PRACTITIONER | Admitting: NURSE PRACTITIONER
Payer: COMMERCIAL

## 2022-09-03 VITALS
DIASTOLIC BLOOD PRESSURE: 95 MMHG | SYSTOLIC BLOOD PRESSURE: 170 MMHG | OXYGEN SATURATION: 99 % | TEMPERATURE: 98.6 F | HEART RATE: 77 BPM | RESPIRATION RATE: 16 BRPM

## 2022-09-03 DIAGNOSIS — J01.40 ACUTE PANSINUSITIS: Primary | ICD-10-CM

## 2022-09-03 DIAGNOSIS — J01.40 ACUTE PANSINUSITIS, RECURRENCE NOT SPECIFIED: ICD-10-CM

## 2022-09-03 PROCEDURE — 99213 OFFICE O/P EST LOW 20 MIN: CPT | Performed by: NURSE PRACTITIONER

## 2022-09-03 PROCEDURE — G0463 HOSPITAL OUTPT CLINIC VISIT: HCPCS

## 2022-09-03 RX ORDER — AZITHROMYCIN 250 MG/1
TABLET, FILM COATED ORAL
Qty: 6 TABLET | Refills: 0 | Status: SHIPPED | OUTPATIENT
Start: 2022-09-03 | End: 2022-09-08

## 2022-09-03 ASSESSMENT — ENCOUNTER SYMPTOMS
FEVER: 0
SINUS PAIN: 1
TROUBLE SWALLOWING: 0
SINUS PRESSURE: 1
EYE PAIN: 1
CHILLS: 0
COUGH: 0
SHORTNESS OF BREATH: 0
SORE THROAT: 0

## 2022-09-03 ASSESSMENT — ACTIVITIES OF DAILY LIVING (ADL): ADLS_ACUITY_SCORE: 33

## 2022-09-03 NOTE — ED PROVIDER NOTES
History     Chief Complaint   Patient presents with     Sinusitis     HPI  Lakeisha Herman is a 57 year old female who presents ambulatory to urgent care with concerns of a sinus infection.  About 3 to 4 days ago patient developed some sinus pressure behind bilateral eyes.  On the last couple days the pressure and pain has significantly worsened causing her to miss work today.  She can feel a little bit of postnasal drainage.  No fever or chills.  No cough, chest pain or shortness of breath.  No known recent ill contacts.  Patient declines testing for COVID-19.    Allergies:  Allergies   Allergen Reactions     Pcn [Penicillins] Rash       Problem List:    Patient Active Problem List    Diagnosis Date Noted     Left knee pain 03/09/2016     Priority: Medium     Obesity 03/13/2014     Priority: Medium     Overview:   Updated per 10/1/17 IMO import       Fibroids 09/02/2013     Priority: Medium     Diabetes mellitus, type 2 (H) 11/06/2007     Priority: Medium     Overview:   HGA1C      7.8   5/29/2014  Goal: <7.0        Hypertension 10/23/2007     Priority: Medium     Allergic rhinitis 09/05/2007     Priority: Medium        Past Medical History:    History reviewed. No pertinent past medical history.    Past Surgical History:    Past Surgical History:   Procedure Laterality Date     cyst removed      neck     XR WRIST SURGERY JACY LEFT      unsure specifics       Family History:    Family History   Problem Relation Age of Onset     Hypoglycemia Mother      Cerebrovascular Disease Maternal Grandmother      Dementia Maternal Grandmother        Social History:  Marital Status:   [4]  Social History     Tobacco Use     Smoking status: Never Smoker     Smokeless tobacco: Never Used        Medications:    azithromycin (ZITHROMAX Z-JUANPABLO) 250 MG tablet  aspirin (ASA) 81 MG EC tablet  benzonatate (TESSALON) 100 MG capsule  blood glucose (CONTOUR NEXT TEST) test strip  blood glucose monitoring (KARY MICROLET)  lancets  empagliflozin (JARDIANCE) 10 MG TABS tablet  lisinopril (ZESTRIL) 5 MG tablet  metFORMIN (GLUCOPHAGE) 1000 MG tablet          Review of Systems   Constitutional: Negative for chills and fever.   HENT: Positive for sinus pressure and sinus pain. Negative for sore throat and trouble swallowing.    Eyes: Positive for pain.   Respiratory: Negative for cough and shortness of breath.    All other systems reviewed and are negative.      Physical Exam   BP: 170/95  Pulse: 77  Temp: 98.6  F (37  C)  Resp: 16  SpO2: 99 %      Physical Exam  Vitals and nursing note reviewed.   Constitutional:       Appearance: Normal appearance. She is not ill-appearing or toxic-appearing.   HENT:      Head: Atraumatic. No right periorbital erythema or left periorbital erythema.      Right Ear: Tympanic membrane and ear canal normal.      Left Ear: Tympanic membrane and ear canal normal.      Nose: Congestion present. No rhinorrhea.      Right Sinus: Maxillary sinus tenderness and frontal sinus tenderness present.      Left Sinus: Maxillary sinus tenderness and frontal sinus tenderness present.      Comments: TTP to bilateral frontal, around sphenoid/ethmoid and maxillary sinuses.     Mouth/Throat:      Mouth: Mucous membranes are moist.      Pharynx: No oropharyngeal exudate or posterior oropharyngeal erythema.   Eyes:      General:         Right eye: No discharge.         Left eye: No discharge.      Extraocular Movements: Extraocular movements intact.      Pupils: Pupils are equal, round, and reactive to light.   Cardiovascular:      Rate and Rhythm: Normal rate and regular rhythm.      Heart sounds: Normal heart sounds.   Pulmonary:      Effort: Pulmonary effort is normal. No respiratory distress.      Breath sounds: Normal breath sounds.   Musculoskeletal:         General: Normal range of motion.      Cervical back: Neck supple.   Skin:     General: Skin is warm and dry.      Capillary Refill: Capillary refill takes less than 2  seconds.   Neurological:      Mental Status: She is alert and oriented to person, place, and time.         ED Course                 Procedures            No results found for this or any previous visit (from the past 24 hour(s)).    Medications - No data to display    Assessments & Plan (with Medical Decision Making)     I have reviewed the nursing notes.    This is a pleasant 57-year-old female that presented for evaluation of sinus pressure and pain with symptoms rapidly worsening in the last 3 to 4 days.  Tenderness to palpation to bilateral frontal and maxillary sinuses as well as around the sphenoid/ethmoid sinuses. Her heart rate and rhythm are regular.  Her respirations are nonlabored.  Vital signs within normal limits.  Patient declines COVID-19 testing today.    Discussed findings with patient.  Azithromycin as prescribed.  Strongly advised patient to take an oral decongestant, push fluids and she may continue taking Tylenol or ibuprofen as needed for pain.  Follow-up with PCP as needed.  Return to ED/UC for worsening or concerning symptoms.    I have reviewed the findings, diagnosis, plan and need for follow up with the patient.  This document was prepared using a combination of typing and voice generated software.  While every attempt was made for accuracy, spelling and grammatical errors may exist.    Discharge Medication List as of 9/3/2022  1:03 PM      START taking these medications    Details   azithromycin (ZITHROMAX Z-JUANPABLO) 250 MG tablet Two tablets on the first day, then one tablet daily for the next 4 days, Disp-6 tablet, R-0, E-Prescribe             Final diagnoses:   Acute pansinusitis       9/3/2022   [facility]     Mpofu, Prudence, CNP  09/03/22 [id number]

## 2022-09-03 NOTE — DISCHARGE INSTRUCTIONS
Take antibiotic as prescribed until finished. Take oral decongestant as well. Drink plenty of fluids.     Continue with tylenol or aleve as needed.     Follow up with your doctor if no improvement in symptoms.    Return to emergency department for worsening or concerning symptoms.

## 2022-09-03 NOTE — ED TRIAGE NOTES
Pt presents with c/o sinus pressure, a little drainage not much, eye pressure. Sx started Wednesday. Hx of sinus infections. Pt did take tylenol and aleve. Denies any antihistamines. Pt refuses covid test at this time.      Triage Assessment     Row Name 09/03/22 1246       Triage Assessment (Adult)    Airway WDL WDL       Respiratory WDL    Respiratory WDL WDL       Skin Circulation/Temperature WDL    Skin Circulation/Temperature WDL WDL       Cardiac WDL    Cardiac WDL WDL

## 2022-09-03 NOTE — Clinical Note
Lakeisha Herman was seen and treated in our emergency department on 9/3/2022.         Sincerely,     [facility]

## 2022-09-04 ENCOUNTER — HEALTH MAINTENANCE LETTER (OUTPATIENT)
Age: 57
End: 2022-09-04

## 2022-09-25 ENCOUNTER — HOSPITAL ENCOUNTER (EMERGENCY)
Facility: HOSPITAL | Age: 57
Discharge: HOME | End: 2022-09-25
Attending: NURSE PRACTITIONER | Admitting: NURSE PRACTITIONER
Payer: COMMERCIAL

## 2022-09-25 ENCOUNTER — TELEPHONE (OUTPATIENT)
Dept: NURSING | Facility: CLINIC | Age: 57
End: 2022-09-25

## 2022-09-25 VITALS
TEMPERATURE: 97.2 F | RESPIRATION RATE: 16 BRPM | SYSTOLIC BLOOD PRESSURE: 129 MMHG | HEART RATE: 86 BPM | OXYGEN SATURATION: 98 % | DIASTOLIC BLOOD PRESSURE: 85 MMHG

## 2022-09-25 DIAGNOSIS — J06.9 URI WITH COUGH AND CONGESTION: Primary | ICD-10-CM

## 2022-09-25 LAB
FLUAV RNA SPEC QL NAA+PROBE: NEGATIVE
FLUBV RNA RESP QL NAA+PROBE: NEGATIVE
RSV RNA SPEC NAA+PROBE: NEGATIVE
SARS-COV-2 RNA RESP QL NAA+PROBE: POSITIVE

## 2022-09-25 PROCEDURE — C9803 HOPD COVID-19 SPEC COLLECT: HCPCS

## 2022-09-25 PROCEDURE — G0463 HOSPITAL OUTPT CLINIC VISIT: HCPCS

## 2022-09-25 PROCEDURE — 87637 SARSCOV2&INF A&B&RSV AMP PRB: CPT | Performed by: NURSE PRACTITIONER

## 2022-09-25 PROCEDURE — 99213 OFFICE O/P EST LOW 20 MIN: CPT | Performed by: NURSE PRACTITIONER

## 2022-09-25 RX ORDER — BENZONATATE 100 MG/1
100 CAPSULE ORAL 3 TIMES DAILY PRN
Qty: 21 CAPSULE | Refills: 0 | Status: SHIPPED | OUTPATIENT
Start: 2022-09-25 | End: 2023-05-24

## 2022-09-25 RX ORDER — PREDNISONE 20 MG/1
TABLET ORAL
Qty: 10 TABLET | Refills: 0 | Status: SHIPPED | OUTPATIENT
Start: 2022-09-25 | End: 2023-05-16

## 2022-09-25 ASSESSMENT — ENCOUNTER SYMPTOMS
SINUS PAIN: 0
SORE THROAT: 0
RHINORRHEA: 1
COUGH: 1
SHORTNESS OF BREATH: 0
FEVER: 0
APPETITE CHANGE: 0
VOICE CHANGE: 0
SINUS PRESSURE: 0
CHILLS: 0
TROUBLE SWALLOWING: 0

## 2022-09-25 NOTE — ED PROVIDER NOTES
History     Chief Complaint   Patient presents with     Pharyngitis     Cough     HPI  Lakeisha Herman is a 57 year old female who presents to urgent care for evaluation of  RESPIRATORY SYMPTOMS      Duration: 4 weeks    Description  nasal congestion, rhinorrhea, cough and hoarse voice    Severity: mild    Accompanying signs and symptoms: None    History (predisposing factors):  none    Precipitating or alleviating factors: None    Therapies tried and outcome:  rest and fluids oral decongestant acetaminophen     Seen and treated for sinus infection with Azithromycin on 9/3/22. Sinus symptoms improved. She now has a persistent cough and postnasal drainage. No tobacco use. No recent ill contacts.        Allergies:  Allergies   Allergen Reactions     Pcn [Penicillins] Rash       Problem List:    Patient Active Problem List    Diagnosis Date Noted     Left knee pain 03/09/2016     Priority: Medium     Obesity 03/13/2014     Priority: Medium     Overview:   Updated per 10/1/17 IMO import       Fibroids 09/02/2013     Priority: Medium     Diabetes mellitus, type 2 (H) 11/06/2007     Priority: Medium     Overview:   HGA1C      7.8   5/29/2014  Goal: <7.0        Hypertension 10/23/2007     Priority: Medium     Allergic rhinitis 09/05/2007     Priority: Medium        Past Medical History:    History reviewed. No pertinent past medical history.    Past Surgical History:    Past Surgical History:   Procedure Laterality Date     cyst removed      neck     XR WRIST SURGERY JACY LEFT      unsure specifics       Family History:    Family History   Problem Relation Age of Onset     Hypoglycemia Mother      Cerebrovascular Disease Maternal Grandmother      Dementia Maternal Grandmother        Social History:  Marital Status:   [4]  Social History     Tobacco Use     Smoking status: Never Smoker     Smokeless tobacco: Never Used        Medications:    aspirin (ASA) 81 MG EC tablet  benzonatate (TESSALON) 100 MG  capsule  blood glucose (CONTOUR NEXT TEST) test strip  blood glucose monitoring (KARY MICROLET) lancets  empagliflozin (JARDIANCE) 10 MG TABS tablet  lisinopril (ZESTRIL) 5 MG tablet  metFORMIN (GLUCOPHAGE) 1000 MG tablet  predniSONE (DELTASONE) 20 MG tablet          Review of Systems   Constitutional: Negative for appetite change, chills and fever.   HENT: Positive for congestion, postnasal drip and rhinorrhea. Negative for ear pain, sinus pressure, sinus pain, sore throat, trouble swallowing and voice change.    Respiratory: Positive for cough. Negative for shortness of breath.    Cardiovascular: Negative for chest pain.   All other systems reviewed and are negative.      Physical Exam   BP: 129/85  Pulse: 86  Temp: 97.2  F (36.2  C)  Resp: 16  SpO2: 98 %      Physical Exam  Vitals and nursing note reviewed.   Constitutional:       General: She is not in acute distress.     Appearance: She is well-developed. She is not diaphoretic.   HENT:      Head: Atraumatic.      Right Ear: Tympanic membrane and ear canal normal.      Left Ear: Tympanic membrane and ear canal normal.      Nose: Congestion present.      Mouth/Throat:      Mouth: Mucous membranes are moist.      Pharynx: No oropharyngeal exudate.      Tonsils: No tonsillar exudate or tonsillar abscesses. 0 on the right. 0 on the left.   Eyes:      General: No scleral icterus.     Pupils: Pupils are equal, round, and reactive to light.   Cardiovascular:      Rate and Rhythm: Normal rate and regular rhythm.      Heart sounds: Normal heart sounds.   Pulmonary:      Effort: Pulmonary effort is normal. No respiratory distress.      Breath sounds: Normal breath sounds. No stridor. No wheezing, rhonchi or rales.   Abdominal:      General: Bowel sounds are normal.   Musculoskeletal:         General: No tenderness.      Cervical back: Neck supple.   Skin:     General: Skin is warm.      Findings: No rash.   Neurological:      Mental Status: She is alert.         ED  Course                 Procedures              No results found for this or any previous visit (from the past 24 hour(s)).    Medications - No data to display    Assessments & Plan (with Medical Decision Making)     I have reviewed the nursing notes.    57-year-old female that presented for evaluation of persistent cough.  Patient was seen earlier this month and treated for sinus infection and notes that her symptoms have improved.  She now has some postnasal drainage as well as a cough.  Not having any trouble breathing.  Her respirations are nonlabored.  Her heart rate and rhythm are regular.  Bilateral lung fields are clear to auscultation.  Her vital signs are within normal limits.    Patient did agree to a COVID-19 test today which was done with results pending.  Recommended conservative treatment at this time with continued use of oral decongestant, Tylenol as well as pushing fluids.  We will treat her with Toradol Tessalon Perles as well as a prednisone burst.  Patient be notified of her COVID-19 result when available.  Follow-up with primary care provider if no improvement in symptoms.  Return to ED/UC for worsening or concerning symptoms.    I have reviewed the findings, diagnosis, plan and need for follow up with the patient.  This document was prepared using a combination of typing and voice generated software.  While every attempt was made for accuracy, spelling and grammatical errors may exist.    New Prescriptions    BENZONATATE (TESSALON) 100 MG CAPSULE    Take 1 capsule (100 mg) by mouth 3 times daily as needed for cough    PREDNISONE (DELTASONE) 20 MG TABLET    Take two tablets (= 40mg) each day for 5 (five) days       Final diagnoses:   URI with cough and congestion       9/25/2022   [facility]     Mpofu, Prudence, CNP  09/25/22 1961

## 2022-09-25 NOTE — DISCHARGE INSTRUCTIONS
Take the Tessalon Perles and prednisone as prescribed.  Continue taking the oral decongestant.    Tylenol as needed for pain.  Drink plenty of fluids.    We will notify you of your COVID-19 result when available.    Follow-up with your doctor if no improvement in symptoms.      Return to the emergency department for worsening or concerning symptoms.

## 2022-09-25 NOTE — ED TRIAGE NOTES
Pt present today with ongoing cough and congestion, did a course of abx from here (9/3) and still has not been able to shake the dry cough, headaches, and congestion. Pt states she has had a raspy voice and sore throat. Denies fever and chills.Therapies tried ibuprofen and tylenol with no relief.

## 2022-09-25 NOTE — TELEPHONE ENCOUNTER
Patient classified as COVID treatment eligible by Epic high risk algorithm:  Yes    Coronavirus (COVID-19) Notification    Reason for call  Notify of POSITIVE COVID-19 lab result, assess symptoms,  review [facility] recommendations    Lab Result   Lab test for 2019-nCoV rRt-PCR or SARS-COV-2 PCR  Oropharyngeal AND/OR nasopharyngeal swabs were POSITIVE for 2019-nCoV RNA [OR] SARS-COV-2 RNA (COVID-19) RNA     We have been unable to reach patient by phone at this time to notify of their Positive COVID-19 result.    Left voicemail message requesting a call back to [phone] [facility] for results.        A Positive COVID-19 letter will be sent via [employer] or the mail.    Keshia Stephenson

## 2022-09-25 NOTE — Clinical Note
Lakeisha Herman was seen and treated in our emergency department on 9/25/2022.         Sincerely,     [facility]

## 2022-10-16 DIAGNOSIS — E11.65 UNCONTROLLED TYPE 2 DIABETES MELLITUS WITH HYPERGLYCEMIA (H): ICD-10-CM

## 2022-10-17 NOTE — TELEPHONE ENCOUNTER
metFORMIN      Last Written Prescription Date:  1/13/21  Last Fill Quantity: 180,   # refills: 3  Last Office Visit: 1/13/21  Future Office visit:       Routing refill request to provider for review/approval because:

## 2022-10-17 NOTE — TELEPHONE ENCOUNTER
Attempt # 1  Outcome: Left Message   Comment: lvm for pt to call back to schedule med review with pcp; if pt wants can schedule wit provider on 10/31 at 2:10, 2:30 or 2:50

## 2022-10-18 ENCOUNTER — TELEPHONE (OUTPATIENT)
Dept: FAMILY MEDICINE | Facility: OTHER | Age: 57
End: 2022-10-18

## 2022-10-18 ENCOUNTER — TELEPHONE (OUTPATIENT)
Dept: EDUCATION SERVICES | Facility: HOSPITAL | Age: 57
End: 2022-10-18

## 2022-10-18 DIAGNOSIS — E11.65 TYPE 2 DIABETES MELLITUS WITH HYPERGLYCEMIA, WITHOUT LONG-TERM CURRENT USE OF INSULIN (H): Primary | ICD-10-CM

## 2022-10-18 NOTE — TELEPHONE ENCOUNTER
Lisinopril    10mg  Last Written Prescription Date:  02/06/10 per pharmacy  Last Fill Quantity: 31,   # refills: unknown  Last Office Visit: 01/31/21  Future Office visit:         Pharmacy requests 10mg, our med list states she is on 5mg. Refill on 10/16/22 states she needs an appt, and opt was called, but no appt has been scheduled.

## 2022-10-18 NOTE — TELEPHONE ENCOUNTER
Pt called and requested a test kit, I called her back and left a message to see what Dm supplies she needs.

## 2022-10-20 NOTE — TELEPHONE ENCOUNTER
Attempt # 2  Outcome: Left Message   Comment: lvm for pt to call back to schedule med review with pcp; please schedule next available

## 2022-10-21 RX ORDER — BLOOD SUGAR DIAGNOSTIC
STRIP MISCELLANEOUS
Qty: 100 STRIP | Refills: 6 | Status: SHIPPED | OUTPATIENT
Start: 2022-10-21 | End: 2023-05-24

## 2022-10-21 RX ORDER — LANCETS
EACH MISCELLANEOUS
Qty: 100 EACH | Refills: 3 | Status: SHIPPED | OUTPATIENT
Start: 2022-10-21 | End: 2023-05-24

## 2022-10-21 NOTE — TELEPHONE ENCOUNTER
Roro Armenta, RD  You 4 hours ago (8:58 AM)     JL  It appears Accu-Chek Guide meter is covered by her insurance now.  I placed one at the  of the clinic for her to  and ordered the supplies from [facility] [place].  Please call her and let her know.   Thanks!

## 2022-10-25 NOTE — TELEPHONE ENCOUNTER
Attempt # 3  Outcome: Letter sent - max attempts reached   Comment: sent letter advising pt needs to schedule med review

## 2022-10-28 DIAGNOSIS — R80.9 MICROALBUMINURIA: ICD-10-CM

## 2022-10-28 DIAGNOSIS — E11.65 UNCONTROLLED TYPE 2 DIABETES MELLITUS WITH HYPERGLYCEMIA (H): ICD-10-CM

## 2022-10-31 RX ORDER — LISINOPRIL 5 MG/1
5 TABLET ORAL DAILY
Qty: 90 TABLET | Refills: 0 | Status: SHIPPED | OUTPATIENT
Start: 2022-10-31 | End: 2023-05-24

## 2022-10-31 NOTE — TELEPHONE ENCOUNTER
lisinopril (ZESTRIL) 5 MG tablet      Last Written Prescription Date:  12-16-30  Last Fill Quantity: 90,   # refills: 3  Last Office Visit: 1-13-21  Future Office visit:       Routing refill request to provider for review/approval because:   Recent (12 mo) or future (30 days) visit within the authorizing provider's specialty    Normal serum creatinine on file in past 12 months    Normal serum potassium on file in past 12 months

## 2022-11-15 NOTE — TELEPHONE ENCOUNTER
Attempt # 3  Outcome: Letter sent - max attempts reached   Comment: sent letter advising needs med review

## 2023-01-15 ENCOUNTER — HEALTH MAINTENANCE LETTER (OUTPATIENT)
Age: 58
End: 2023-01-15

## 2023-04-29 ENCOUNTER — HEALTH MAINTENANCE LETTER (OUTPATIENT)
Age: 58
End: 2023-04-29

## 2023-05-01 ENCOUNTER — TRANSFERRED RECORDS (OUTPATIENT)
Dept: MULTI SPECIALTY CLINIC | Facility: CLINIC | Age: 58
End: 2023-05-01

## 2023-05-01 DIAGNOSIS — E11.65 UNCONTROLLED TYPE 2 DIABETES MELLITUS WITH HYPERGLYCEMIA (H): ICD-10-CM

## 2023-05-01 LAB — RETINOPATHY: NORMAL

## 2023-05-02 NOTE — TELEPHONE ENCOUNTER
Metformin      Last Written Prescription Date:  10.17.22  Last Fill Quantity: #60,   # refills: 0  Last Office Visit: 1.13.21  Future Office visit:    Next 5 appointments (look out 90 days)    May 17, 2023  8:30 AM  (Arrive by 8:15 AM)  SHORT with Genesis Falk NP  [facility] ([facility] [place] ) [address]  [place] [address]  [phone]           Routing refill request to provider for review/approval because:

## 2023-05-04 NOTE — TELEPHONE ENCOUNTER
Patient called clinic asking for refill on Metformin.  Prescription was denied due to patient needing to be seen. Patient scheduled to see PCP on 05/17/23.  Patient asking for refill until upcoming appointment. PCP to advise.

## 2023-05-10 ENCOUNTER — TRANSFERRED RECORDS (OUTPATIENT)
Dept: HEALTH INFORMATION MANAGEMENT | Facility: CLINIC | Age: 58
End: 2023-05-10

## 2023-05-10 LAB — RETINOPATHY: POSITIVE

## 2023-05-17 ENCOUNTER — TELEPHONE (OUTPATIENT)
Dept: FAMILY MEDICINE | Facility: OTHER | Age: 58
End: 2023-05-17

## 2023-05-17 DIAGNOSIS — E11.65 UNCONTROLLED TYPE 2 DIABETES MELLITUS WITH HYPERGLYCEMIA (H): ICD-10-CM

## 2023-05-17 NOTE — TELEPHONE ENCOUNTER
Metformin      Last Written Prescription Date:  05/04/23  Last Fill Quantity: 30,   # refills: 0  Last Office Visit: 01/13/21  Future Office visit:    Next 5 appointments (look out 90 days)    May 17, 2023  8:30 AM  (Arrive by 8:15 AM)  SHORT with Genesis Falk NP  [facility] ([facility] ) [address] [address]  [phone]   Jun 22, 2023  9:30 AM  (Arrive by 9:15 AM)  SHORT with Genesis Falk NP  [facility] ([facility] ) [address] [address]  [phone]           Routing refill request to provider for review/approval because:  Patient overdue for appointment.  Patient called requesting medication.  Patient cancelled appointment scheduled for today. Patient rescheduled for 06/22/23. Provider to review.

## 2023-05-17 NOTE — TELEPHONE ENCOUNTER
8:09 AM    Reason for Call: Phone Call    Description: Patient is requesting an A1C Lab work to be done before her appointment on 6/22/23    Preferred method for responding to this message: Telephone Call  What is your phone number ? [phone]    If we cannot reach you directly, may we leave a detailed response at the number you provided? Yes    Can this message wait until your PCP/provider returns, if available today? YES, No

## 2023-05-20 DIAGNOSIS — E11.65 UNCONTROLLED TYPE 2 DIABETES MELLITUS WITH HYPERGLYCEMIA (H): ICD-10-CM

## 2023-05-22 DIAGNOSIS — E11.65 UNCONTROLLED TYPE 2 DIABETES MELLITUS WITH HYPERGLYCEMIA (H): ICD-10-CM

## 2023-05-22 NOTE — TELEPHONE ENCOUNTER
Metformin  Last Written Prescription Date:  5/4/23  Last Fill Quantity: 30,  # refills: 0   Last office visit: 1/13/21 with prescribing provider:     Future Office Visit:   Next 5 appointments (look out 90 days)    Jun 05, 2023 10:00 AM  (Arrive by 9:45 AM)  SHORT with Genesis Falk NP  [facility] ([facility] [place] ) [address]  [place] [address]  [phone]

## 2023-05-22 NOTE — TELEPHONE ENCOUNTER
Patient rescheduled to see provider on 05/24/23 for DM followup. Provider approved short term fill until appointment. Short term fill pended.

## 2023-05-22 NOTE — PROGRESS NOTES
Assessment & Plan     Uncontrolled type 2 diabetes mellitus with hyperglycemia (H)  A1c pending. Will notify patient of the results when available and intervene accordingly. I suspect it will be high. Will refer back to the [facility]. Did not tolerate Jardiance. Possible candidate for injection? Eye exam up to date. Blood pressure high. See below. Will also consider starting statin when lipids return. Will see her back in 3 months for her diabetes.     - Comprehensive metabolic panel (BMP + Alb, Alk Phos, ALT, AST, Total. Bili, TP)  - Hemoglobin A1c  - Lipid Profile (Chol, Trig, HDL, LDL calc)  - Albumin Random Urine Quantitative with Creat Ratio  - metFORMIN (GLUCOPHAGE) 1000 MG tablet; Take 1 tablet (1,000 mg) by mouth 2 times daily (with meals)  - aspirin (ASA) 81 MG EC tablet; Take 1 tablet (81 mg) by mouth daily  - Diabetes Education Referral ([place])    Microalbuminuria  On ACE. Will continue. Will also recheck her urine today. Will notify patient of the results when available and intervene accordingly.     Essential hypertension  Blood pressure high today at 147/86. No consistently taking her lisinopril 5 mg. Will increase to 10 mg and reassess in 4 weeks. Sooner with new or worsening symptoms.     - lisinopril (ZESTRIL) 10 MG tablet; Take 1 tablet (10 mg) by mouth daily    Lipid screening  - Lipid Profile (Chol, Trig, HDL, LDL calc)  -Will notify patient of the results when available and intervene accordingly.     Special screening for malignant neoplasms, colon  - COLOGUARD([employer]); Future-declines colonoscopy.   -Will notify patient of the results when available and intervene accordingly.     Swelling of lower leg  Bilateral lower leg swelling. Will check BNP today. Will notify patient of the results when available and intervene accordingly. If normal, will have her limit her sodium intake and wear KARLA socks.   - BNP-N terminal pro    Onychomycosis  Ingrown toenail of right foot  Orthopedic   Referral        Patient with onychomycosis of toenails. Nails overgrown and discolored. Also appears to have ingrown toenail without infection. Will send to podiatry as she does have diabetes.     Encounter for screening mammogram for breast cancer  MA Screen Bilateral w/Antelmo        Will notify patient of the results when available and intervene accordingly.         Return in about 4 weeks (around 6/21/2023).    Genesis Falk NP  [facility] [place] Suazo is a 58 year old, presenting for the following health issues:  Lipids, Hypertension, and Diabetes      HPI     Diabetes Follow-up      How often are you checking your blood sugar? Not at all    What concerns do you have today about your diabetes? Feet and eyes are getting worse      Do you have any of these symptoms? (Select all that apply)  No numbness or tingling in feet.  No redness, sores or blisters on feet.  No complaints of excessive thirst.  No reports of blurry vision.  No significant changes to weight.    Have you had a diabetic eye exam in the last 12 months? Yes- Date of last eye exam: 5/2023,  Location: Eye care clinic     A1C was 12.1 on 12/16/20.     Poor follow-up.     Taking Metformin 1000 mg BID without side effects. Was also on Jardiance 10 mg, but she stopped as it caused brain fog.    Denies chest pain, shortness of breath, dizziness, syncope, or palpitations.     Due for a colonoscopy. Declines. Willing to complete a Cologuard.     Due for several vaccines. Declines.     Due for a mammogram. Willing to get.     Due for a CP with pap. Encouraged to schedule.       Hyperlipidemia Follow-Up      Are you regularly taking any medication or supplement to lower your cholesterol?   No    Are you having muscle aches or other side effects that you think could be caused by your cholesterol lowering medication?  N/A    Denies chest pain, shortness of breath, dizziness, syncope, or palpitations.         Hypertension Follow-up      Do you check your blood pressure regularly outside of the clinic? No    Are you following a low salt diet? Yes    Are your blood pressures ever more than 140 on the top number (systolic) OR more   than 90 on the bottom number (diastolic), for example 140/90? N/A   Denies chest pain, shortness of breath, dizziness, syncope, or palpitations.    Taking lisinopril 5 mg without side effects.     BP Readings from Last 2 Encounters:   05/24/23 (!) 147/86   09/25/22 129/85     Hemoglobin A1C (%)   Date Value   12/16/2020 12.1 (H)     LDL Cholesterol Calculated (mg/dL)   Date Value   01/13/2021 96   12/16/2020 85         Review of Systems   Constitutional, HEENT, cardiovascular, pulmonary, gi and gu systems are negative, except as otherwise noted.      Objective    BP (!) 147/86 (BP Location: Right arm, Patient Position: Sitting, Cuff Size: Adult Regular)   Pulse 98   Temp 97  F (36.1  C) (Tympanic)   Wt 79 kg (174 lb 1.6 oz)   SpO2 98%   BMI 27.27 kg/m    Body mass index is 27.27 kg/m .  Physical Exam   GENERAL: healthy, alert and no distress  EYES: Eyes grossly normal to inspection, PERRL and conjunctivae and sclerae normal  HENT: ear canals and TM's normal, nose and mouth without ulcers or lesions  NECK: no adenopathy, no asymmetry, masses, or scars and thyroid normal to palpation  RESP: lungs clear to auscultation - no rales, rhonchi or wheezes  CV: regular rate and rhythm, no murmur, bilateral 1+ peripheral edema and peripheral pulses strong  ABDOMEN: soft, nontender, no hepatosplenomegaly, no masses and bowel sounds normal  MS: no gross musculoskeletal defects noted, no edema  NEURO: Normal strength and tone, mentation intact and speech normal  PSYCH: mentation appears normal, affect normal/bright  Diabetic foot exam: normal DP and PT pulses, no trophic changes or ulcerative lesions, normal sensory exam and onychomycosis-toenails overgrown, thick, and discolored, right great toe  appears ingrown without an infection    Labs in process

## 2023-05-22 NOTE — TELEPHONE ENCOUNTER
Patient scheduled future appt, 6/5, but will be out of medication before appt.   Last script written for 30 tablets instead of 60 tablets.    metFORMIN (GLUCOPHAGE) 1000 MG tablet      Last Written Prescription Date:  5/4/23  Last Fill Quantity: 30,   # refills: 0  Last Office Visit: 1/13/21  Future Office visit:    Next 5 appointments (look out 90 days)    Jun 05, 2023 10:00 AM  (Arrive by 9:45 AM)  SHORT with Genesis Falk NP  [facility] ([facility] [place] ) [address]  [place] [address]  [phone]           Routing refill request to provider for review/approval because:    Biguanide Agents Failed 05/22/2023 09:56 AM   Protocol Details  Patient has documented A1c within the specified period of time.    Patient's CR is NOT>1.4 OR Patient's EGFR is NOT<45 within past 12 mos.

## 2023-05-24 ENCOUNTER — OFFICE VISIT (OUTPATIENT)
Dept: FAMILY MEDICINE | Facility: OTHER | Age: 58
End: 2023-05-24
Attending: NURSE PRACTITIONER
Payer: COMMERCIAL

## 2023-05-24 ENCOUNTER — LAB (OUTPATIENT)
Dept: FAMILY MEDICINE | Facility: OTHER | Age: 58
End: 2023-05-24

## 2023-05-24 VITALS
TEMPERATURE: 97 F | OXYGEN SATURATION: 98 % | SYSTOLIC BLOOD PRESSURE: 147 MMHG | WEIGHT: 174.1 LBS | HEART RATE: 98 BPM | BODY MASS INDEX: 27.27 KG/M2 | DIASTOLIC BLOOD PRESSURE: 86 MMHG

## 2023-05-24 DIAGNOSIS — I10 ESSENTIAL HYPERTENSION: ICD-10-CM

## 2023-05-24 DIAGNOSIS — Z13.220 LIPID SCREENING: ICD-10-CM

## 2023-05-24 DIAGNOSIS — B35.1 ONYCHOMYCOSIS: ICD-10-CM

## 2023-05-24 DIAGNOSIS — M79.89 SWELLING OF LOWER LEG: ICD-10-CM

## 2023-05-24 DIAGNOSIS — R80.9 MICROALBUMINURIA: ICD-10-CM

## 2023-05-24 DIAGNOSIS — Z12.11 SPECIAL SCREENING FOR MALIGNANT NEOPLASMS, COLON: ICD-10-CM

## 2023-05-24 DIAGNOSIS — Z12.31 ENCOUNTER FOR SCREENING MAMMOGRAM FOR BREAST CANCER: ICD-10-CM

## 2023-05-24 DIAGNOSIS — R74.8 ALKALINE PHOSPHATASE ELEVATION: ICD-10-CM

## 2023-05-24 DIAGNOSIS — E11.65 UNCONTROLLED TYPE 2 DIABETES MELLITUS WITH HYPERGLYCEMIA (H): Primary | ICD-10-CM

## 2023-05-24 DIAGNOSIS — L60.0 INGROWN TOENAIL OF RIGHT FOOT: ICD-10-CM

## 2023-05-24 LAB
ALBUMIN SERPL BCG-MCNC: 4.4 G/DL (ref 3.5–5.2)
ALP SERPL-CCNC: 133 U/L (ref 35–104)
ALT SERPL W P-5'-P-CCNC: 27 U/L (ref 10–35)
ANION GAP SERPL CALCULATED.3IONS-SCNC: 10 MMOL/L (ref 7–15)
AST SERPL W P-5'-P-CCNC: 21 U/L (ref 10–35)
BILIRUB SERPL-MCNC: 0.3 MG/DL
BUN SERPL-MCNC: 16.7 MG/DL (ref 8–23)
CALCIUM SERPL-MCNC: 9.6 MG/DL (ref 8.8–10.2)
CHLORIDE SERPL-SCNC: 96 MMOL/L (ref 98–107)
CHOLEST SERPL-MCNC: 187 MG/DL
CREAT SERPL-MCNC: 0.6 MG/DL (ref 0.51–0.95)
CREAT UR-MCNC: 85.5 MG/DL
DEPRECATED HCO3 PLAS-SCNC: 29 MMOL/L (ref 22–29)
EST. AVERAGE GLUCOSE BLD GHB EST-MCNC: 306 MG/DL
GFR SERPL CREATININE-BSD FRML MDRD: >90 ML/MIN/1.73M2
GLUCOSE SERPL-MCNC: 252 MG/DL (ref 70–99)
HBA1C MFR BLD: 12.3 %
HDLC SERPL-MCNC: 90 MG/DL
LDLC SERPL CALC-MCNC: 80 MG/DL
MICROALBUMIN UR-MCNC: 12.8 MG/L
MICROALBUMIN/CREAT UR: 14.97 MG/G CR (ref 0–25)
NONHDLC SERPL-MCNC: 97 MG/DL
NT-PROBNP SERPL-MCNC: <36 PG/ML (ref 0–900)
POTASSIUM SERPL-SCNC: 3.9 MMOL/L (ref 3.4–5.3)
PROT SERPL-MCNC: 7.4 G/DL (ref 6.4–8.3)
SODIUM SERPL-SCNC: 135 MMOL/L (ref 136–145)
TRIGL SERPL-MCNC: 84 MG/DL

## 2023-05-24 PROCEDURE — 82570 ASSAY OF URINE CREATININE: CPT | Performed by: NURSE PRACTITIONER

## 2023-05-24 PROCEDURE — 83880 ASSAY OF NATRIURETIC PEPTIDE: CPT | Performed by: NURSE PRACTITIONER

## 2023-05-24 PROCEDURE — 82043 UR ALBUMIN QUANTITATIVE: CPT | Performed by: NURSE PRACTITIONER

## 2023-05-24 PROCEDURE — 36415 COLL VENOUS BLD VENIPUNCTURE: CPT | Performed by: NURSE PRACTITIONER

## 2023-05-24 PROCEDURE — 80053 COMPREHEN METABOLIC PANEL: CPT | Performed by: NURSE PRACTITIONER

## 2023-05-24 PROCEDURE — 82977 ASSAY OF GGT: CPT | Performed by: NURSE PRACTITIONER

## 2023-05-24 PROCEDURE — 83036 HEMOGLOBIN GLYCOSYLATED A1C: CPT | Performed by: NURSE PRACTITIONER

## 2023-05-24 PROCEDURE — 80061 LIPID PANEL: CPT | Performed by: NURSE PRACTITIONER

## 2023-05-24 PROCEDURE — 99214 OFFICE O/P EST MOD 30 MIN: CPT | Performed by: NURSE PRACTITIONER

## 2023-05-24 RX ORDER — BLOOD SUGAR DIAGNOSTIC
STRIP MISCELLANEOUS
Qty: 100 STRIP | Refills: 6 | Status: SHIPPED | OUTPATIENT
Start: 2023-05-24

## 2023-05-24 RX ORDER — LANCETS
EACH MISCELLANEOUS
Qty: 100 EACH | Refills: 3 | Status: SHIPPED | OUTPATIENT
Start: 2023-05-24

## 2023-05-24 RX ORDER — LISINOPRIL 10 MG/1
10 TABLET ORAL DAILY
Qty: 90 TABLET | Refills: 0 | Status: SHIPPED | OUTPATIENT
Start: 2023-05-24 | End: 2023-10-09

## 2023-05-24 RX ORDER — LISINOPRIL 5 MG/1
5 TABLET ORAL DAILY
Qty: 90 TABLET | Refills: 1 | Status: SHIPPED | OUTPATIENT
Start: 2023-05-24 | End: 2023-05-24

## 2023-05-24 ASSESSMENT — PAIN SCALES - GENERAL: PAINLEVEL: NO PAIN (0)

## 2023-05-25 DIAGNOSIS — E78.5 HYPERLIPIDEMIA, UNSPECIFIED HYPERLIPIDEMIA TYPE: Primary | ICD-10-CM

## 2023-05-25 LAB — GGT SERPL-CCNC: 16 U/L (ref 5–36)

## 2023-05-26 ENCOUNTER — TELEPHONE (OUTPATIENT)
Dept: EDUCATION SERVICES | Facility: HOSPITAL | Age: 58
End: 2023-05-26

## 2023-05-26 RX ORDER — ROSUVASTATIN CALCIUM 10 MG/1
10 TABLET, COATED ORAL DAILY
Qty: 90 TABLET | Refills: 0 | Status: SHIPPED | OUTPATIENT
Start: 2023-05-26

## 2023-06-03 ENCOUNTER — HEALTH MAINTENANCE LETTER (OUTPATIENT)
Age: 58
End: 2023-06-03

## 2023-06-12 ENCOUNTER — HOSPITAL ENCOUNTER (EMERGENCY)
Facility: HOSPITAL | Age: 58
Discharge: LEFT BEFORE DISCHARGE | End: 2023-06-13
Payer: COMMERCIAL

## 2023-06-12 VITALS
TEMPERATURE: 97.4 F | OXYGEN SATURATION: 98 % | SYSTOLIC BLOOD PRESSURE: 132 MMHG | HEART RATE: 87 BPM | RESPIRATION RATE: 18 BRPM | DIASTOLIC BLOOD PRESSURE: 84 MMHG

## 2023-06-14 ENCOUNTER — TRANSFERRED RECORDS (OUTPATIENT)
Dept: HEALTH INFORMATION MANAGEMENT | Facility: CLINIC | Age: 58
End: 2023-06-14

## 2023-06-14 LAB — RETINOPATHY: POSITIVE

## 2023-07-13 PROBLEM — E78.5 HYPERLIPIDEMIA, UNSPECIFIED HYPERLIPIDEMIA TYPE: Status: ACTIVE | Noted: 2023-07-13

## 2023-07-18 NOTE — TELEPHONE ENCOUNTER
"Interval History: ***    Review of Systems  Objective:     Vital Signs (Most Recent):  Temp: 98.2 °F (36.8 °C) (07/18/23 0731)  Pulse: (!) 55 (07/18/23 1100)  Resp: 18 (07/18/23 0731)  BP: 126/60 (07/18/23 0731)  SpO2: 98 % (07/18/23 0731) Vital Signs (24h Range):  Temp:  [97.7 °F (36.5 °C)-98.4 °F (36.9 °C)] 98.2 °F (36.8 °C)  Pulse:  [52-67] 55  Resp:  [16-20] 18  SpO2:  [90 %-98 %] 98 %  BP: (118-145)/(53-62) 126/60     Weight: (!) 176 kg (388 lb)  Body mass index is 64.57 kg/m².    Intake/Output Summary (Last 24 hours) at 7/18/2023 1202  Last data filed at 7/18/2023 0938  Gross per 24 hour   Intake 840 ml   Output 2000 ml   Net -1160 ml         Physical Exam        Significant Labs: {Results:45543::"All pertinent labs within the past 24 hours have been reviewed."}    Significant Imaging: {Imaging Review:15663::"I have reviewed all pertinent imaging results/findings within the past 24 hours."}  " Outbound call attempt made to patient with update regarding administered Moderna COVID booster.     No message left, additional attempt will be made to reach patient.     If patient returns call, patient should await return call from call center.     Zena Zepeda RN November 27, 2021 6:10 PM

## 2023-10-01 ENCOUNTER — HEALTH MAINTENANCE LETTER (OUTPATIENT)
Age: 58
End: 2023-10-01

## 2023-10-06 DIAGNOSIS — I10 ESSENTIAL HYPERTENSION: ICD-10-CM

## 2023-10-09 RX ORDER — LISINOPRIL 10 MG/1
10 TABLET ORAL DAILY
Qty: 90 TABLET | Refills: 1 | Status: SHIPPED | OUTPATIENT
Start: 2023-10-09 | End: 2024-03-05

## 2023-12-21 ENCOUNTER — TELEPHONE (OUTPATIENT)
Dept: FAMILY MEDICINE | Facility: OTHER | Age: 58
End: 2023-12-21

## 2023-12-21 NOTE — TELEPHONE ENCOUNTER
Attempt # 1  Outcome: Left Message   Comment: LVM to schedule a BP check with NURSE only per quality list

## 2023-12-31 ENCOUNTER — TRANSFERRED RECORDS (OUTPATIENT)
Dept: OTHER | Facility: HOSPITAL | Age: 58
End: 2023-12-31

## 2023-12-31 VITALS — DIASTOLIC BLOOD PRESSURE: 84 MMHG | SYSTOLIC BLOOD PRESSURE: 132 MMHG

## 2024-01-22 ENCOUNTER — TRANSFERRED RECORDS (OUTPATIENT)
Dept: HEALTH INFORMATION MANAGEMENT | Facility: HOSPITAL | Age: 59
End: 2024-01-22

## 2024-01-22 LAB — HBA1C MFR BLD: 12.9 % (ref 4–5.6)

## 2024-02-05 ENCOUNTER — TELEPHONE (OUTPATIENT)
Dept: FAMILY MEDICINE | Facility: OTHER | Age: 59
End: 2024-02-05

## 2024-02-05 DIAGNOSIS — E11.65 UNCONTROLLED TYPE 2 DIABETES MELLITUS WITH HYPERGLYCEMIA (H): ICD-10-CM

## 2024-02-05 NOTE — TELEPHONE ENCOUNTER
4:15 PM    Reason for Call: Phone Call    Description: Patient is calling because she needs a Rx. Patient states she cannot call the pharmacy for it.  Metformin     Was an appointment offered for this call? No  If yes : Appointment type              Date    Preferred method for responding to this message: Telephone Call  What is your phone number ?  [phone]    If we cannot reach you directly, may we leave a detailed response at the number you provided? Yes    Can this message wait until your PCP/provider returns, if available today? YES, Provider is in    Josefina Terence

## 2024-02-06 NOTE — TELEPHONE ENCOUNTER
metFORMIN (GLUCOPHAGE) 1000 MG tablet       Last Written Prescription Date:  5/24/23  Last Fill Quantity: 180,   # refills: 1  Last Office Visit: 5/24/23  Future Office visit:       Routing refill request to provider for review/approval because:

## 2024-02-08 ENCOUNTER — TELEPHONE (OUTPATIENT)
Dept: FAMILY MEDICINE | Facility: OTHER | Age: 59
End: 2024-02-08

## 2024-02-08 NOTE — TELEPHONE ENCOUNTER
2:47 PM    Reason for Call: Phone Call    Description: pt is calling she would like to speak to a nurse about possibly getting a refill on an insulin that she was prescribed during her hospital stay, pt was not aware that the insulin needed to be refrigerated so it is going to go bad quicker than normal, if you can please give her a call back .     Was an appointment offered for this call? No  If yes : Appointment type              Date    Preferred method for responding to this message: Telephone Call  What is your phone number ?[phone]    If we cannot reach you directly, may we leave a detailed response at the number you provided? Yes    Can this message wait until your PCP/provider returns, if available today?  Not applicable    Rashida Nuñez

## 2024-02-09 NOTE — TELEPHONE ENCOUNTER
Patient  was given Semglee  in the hospital the end of January after foot surgery.    She forgot to refrigerate it.  She thinks it will not last as long without being refrigerated and will need a refill.  She has a follow up with wound care next week at [facility] and she is going to ask them for a refill as well.  States Dr Ferreira at [facility] in [place] was her surgeon.     Patient scheduled a med review for 2/20 at 2:30.  She would like her notes to be sent to Deya before the appointment.          Pharmacy [facility] in [place].

## 2024-02-09 NOTE — TELEPHONE ENCOUNTER
Can we get notes from [facility]  for her foot surgery and hospital stay in [place] the end of January.

## 2024-02-18 ENCOUNTER — HEALTH MAINTENANCE LETTER (OUTPATIENT)
Age: 59
End: 2024-02-18

## 2024-03-04 PROBLEM — E11.65 UNCONTROLLED TYPE 2 DIABETES MELLITUS WITH HYPERGLYCEMIA (H): Status: ACTIVE | Noted: 2024-03-04

## 2024-03-04 PROBLEM — S98.112A AMPUTATION OF LEFT GREAT TOE (H): Status: ACTIVE | Noted: 2024-03-04

## 2024-03-04 NOTE — PROGRESS NOTES
"  Assessment & Plan     (E78.5) Hyperlipidemia, unspecified hyperlipidemia type  (primary encounter diagnosis)  Plan: Not taking statin. Unsure why she stopped in the past. Willing to restart Crestor. Will order and recheck CMP and lipids in 3 months.     (I10) Essential hypertension  Plan: lisinopril (ZESTRIL) 10 MG tablet        Well controlled. Continue current medications. Encouraged daily exercise and a low sodium diet. Recommended checking BP's 2x/wk, call the clinic if consistantly s>140 or d>90. Follow up in 3 months.     (E11.65) Uncontrolled type 2 diabetes mellitus with hyperglycemia (H)  Plan: Hemoglobin A1c, Comprehensive metabolic panel         (BMP + Alb, Alk Phos, ALT, AST, Total. Bili,         TP), Albumin Random Urine Quantitative with         Creat Ratio, metFORMIN (GLUCOPHAGE) 1000 MG         tablet        Recent A1C 12.9. Unable to check today, but will recheck in 3 months. Getting the dex-com. Will start statin. Eye exam UTD. BP at goal. Will recheck in 3 months.     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 Will continue f/up with the wound center.     (Z13.220) Lipid screening  Plan: Lipid Profile (Chol, Trig, HDL, LDL calc)        Will notify patient of the results when available and intervene accordingly.     (Z12.11) Screen for colon cancer  Plan: COLOGUARD([employer])        Will notify patient of the results when available and intervene accordingly.     (Z12.31) Visit for screening mammogram  Plan: MA Screen Bilateral w/Antelmo        Will notify patient of the results when available and intervene accordingly.       BMI  Estimated body mass index is 27.5 kg/m  as calculated from the following:    Height as of this encounter: 1.689 m (5' 6.5\").    Weight as of this encounter: 78.5 kg (173 lb).         Return in about 3 months (around 6/5/2024).    Sherrie Suazo is a 58 year old, presenting for the following health issues:  Diabetes, Lipids, and " Hypertension    HPI     Diabetes Follow-up     How often are you checking your blood sugar? Three times daily, also getting the Dex-com  Blood sugar testing frequency justification:  Adjustment of medication(s)  What time of day are you checking your blood sugars (select all that apply)?  Before meals  Have you had any blood sugars above 200?  No  Have you had any blood sugars below 70?  Yes 1x, was able to tell she was low  What symptoms do you notice when your blood sugar is low?  Dizzy and Other: hot feeling   What concerns do you have today about your diabetes? None   Do you have any of these symptoms? (Select all that apply)  No numbness or tingling in feet.  No redness, sores or blisters on feet.  No complaints of excessive thirst.  No reports of blurry vision.  No significant changes to weight. Just had surgery   -On metformin 1000 mg BID with insulin glargine without side effects.   -A1C was 12.9 on 1/22/24-non-compliant with follow-up.   -Denies chest pain, shortness of breath, dizziness, syncope, or palpitations.      Lakeisha presented to ER on 1/22/24 for concerns of left toe infection. ER workup was notable for CRP 19.1, leukocytosis 15,hyperglycemia 282, and hypomagnesemia 1.6. She was admitted to the hospital, started on vancomycin, cefepime, and metronidazole. A1c was 13% as non-complaint with medications/follow-up.     She underwent hallux amputation with open packing on 1/23/24. She subsequently underwent partial first ray resection and closure on 1/25/24. Ray resection was due to significant soft tissue loss and closure would not be possible without performing metatarsal resection. ID was involved in the case, had placed her on cefuroxime through 3/1/24 and metronidazole through 2/16/24.     Today she notes that her foot is feeling better. Met with the wound center this morning and sutures were removed. No fevers.      Hyperlipidemia Follow-Up     Are you regularly taking any medication or  "supplement to lower your cholesterol? stopped Crestor, willing to restart  Are you having muscle aches or other side effects that you think could be caused by your cholesterol lowering medication?  No  Denies chest pain, shortness of breath, dizziness, syncope, or palpitations.       Hypertension Follow-up     Do you check your blood pressure regularly outside of the clinic? No   Are you following a low salt diet? No  Are your blood pressures ever more than 140 on the top number (systolic) OR more       than 90 on the bottom number (diastolic), for example 140/90? N/A  On lisinopril 10 mg without side effects.       Due for several vaccines. Declines      Due for a CP with pap. Declines.       Review of Systems  Constitutional, HEENT, cardiovascular, pulmonary, gi and gu systems are negative, except as otherwise noted.      Objective    /68 (BP Location: Left arm, Patient Position: Sitting, Cuff Size: Adult Regular)   Pulse 97   Temp 97.7  F (36.5  C) (Tympanic)   Ht 1.689 m (5' 6.5\")   Wt 78.5 kg (173 lb)   SpO2 98%   BMI 27.50 kg/m    Body mass index is 27.5 kg/m .  Physical Exam   GENERAL: alert and no distress  EYES: Eyes grossly normal to inspection, PERRL and conjunctivae and sclerae normal  HENT: ear canals and TM's normal, nose and mouth without ulcers or lesions  NECK: no adenopathy, no asymmetry, masses, or scars  RESP: lungs clear to auscultation - no rales, rhonchi or wheezes  CV: regular rate and rhythm, no murmur, click or rub, no peripheral edema  MS: no gross musculoskeletal defects noted, no edema  NEURO: Normal strength and tone, mentation intact and speech normal  PSYCH: mentation appears normal, affect normal/bright  Diabetic foot exam: declines exam-left foot bandaged.     Labs in process        Signed Electronically by: Genesis Falk NP    "

## 2024-03-05 ENCOUNTER — OFFICE VISIT (OUTPATIENT)
Dept: FAMILY MEDICINE | Facility: OTHER | Age: 59
End: 2024-03-05
Attending: NURSE PRACTITIONER
Payer: COMMERCIAL

## 2024-03-05 ENCOUNTER — HOSPITAL ENCOUNTER (OUTPATIENT)
Dept: EDUCATION SERVICES | Facility: HOSPITAL | Age: 59
Discharge: HOME | End: 2024-03-05
Attending: DIETITIAN, REGISTERED
Payer: COMMERCIAL

## 2024-03-05 ENCOUNTER — ORDERS ONLY (AUTO-RELEASED) (OUTPATIENT)
Dept: FAMILY MEDICINE | Facility: OTHER | Age: 59
End: 2024-03-05

## 2024-03-05 VITALS
HEIGHT: 67 IN | BODY MASS INDEX: 27.15 KG/M2 | WEIGHT: 173 LBS | HEART RATE: 97 BPM | SYSTOLIC BLOOD PRESSURE: 118 MMHG | OXYGEN SATURATION: 98 % | DIASTOLIC BLOOD PRESSURE: 68 MMHG | TEMPERATURE: 97.7 F

## 2024-03-05 VITALS
BODY MASS INDEX: 27.17 KG/M2 | DIASTOLIC BLOOD PRESSURE: 91 MMHG | OXYGEN SATURATION: 99 % | WEIGHT: 173.1 LBS | HEART RATE: 96 BPM | RESPIRATION RATE: 16 BRPM | HEIGHT: 67 IN | SYSTOLIC BLOOD PRESSURE: 139 MMHG

## 2024-03-05 DIAGNOSIS — Z79.4 TYPE 2 DIABETES MELLITUS WITH HYPERGLYCEMIA, WITH LONG-TERM CURRENT USE OF INSULIN (H): ICD-10-CM

## 2024-03-05 DIAGNOSIS — E11.65 UNCONTROLLED TYPE 2 DIABETES MELLITUS WITH HYPERGLYCEMIA (H): Primary | ICD-10-CM

## 2024-03-05 DIAGNOSIS — E11.65 TYPE 2 DIABETES MELLITUS WITH HYPERGLYCEMIA, WITH LONG-TERM CURRENT USE OF INSULIN (H): ICD-10-CM

## 2024-03-05 DIAGNOSIS — Z12.11 SCREEN FOR COLON CANCER: ICD-10-CM

## 2024-03-05 DIAGNOSIS — I10 ESSENTIAL HYPERTENSION: ICD-10-CM

## 2024-03-05 DIAGNOSIS — E78.5 HYPERLIPIDEMIA, UNSPECIFIED HYPERLIPIDEMIA TYPE: Primary | ICD-10-CM

## 2024-03-05 DIAGNOSIS — Z12.31 VISIT FOR SCREENING MAMMOGRAM: ICD-10-CM

## 2024-03-05 DIAGNOSIS — S98.112A AMPUTATION OF LEFT GREAT TOE (H): ICD-10-CM

## 2024-03-05 DIAGNOSIS — E11.65 UNCONTROLLED TYPE 2 DIABETES MELLITUS WITH HYPERGLYCEMIA (H): ICD-10-CM

## 2024-03-05 DIAGNOSIS — Z13.220 LIPID SCREENING: ICD-10-CM

## 2024-03-05 PROBLEM — L08.9 DIABETIC FOOT INFECTION (H): Status: ACTIVE | Noted: 2024-01-23

## 2024-03-05 PROBLEM — E11.628 DIABETIC FOOT INFECTION (H): Status: ACTIVE | Noted: 2024-01-23

## 2024-03-05 PROBLEM — M86.9 OSTEOMYELITIS (H): Status: ACTIVE | Noted: 2024-01-22

## 2024-03-05 PROBLEM — M86.9 OSTEOMYELITIS (H): Status: RESOLVED | Noted: 2024-01-22 | Resolved: 2024-03-05

## 2024-03-05 PROBLEM — M86.9 OSTEOMYELITIS OF GREAT TOE OF LEFT FOOT (H): Status: ACTIVE | Noted: 2024-01-23

## 2024-03-05 LAB
ALBUMIN SERPL BCG-MCNC: 4.2 G/DL (ref 3.5–5.2)
ALP SERPL-CCNC: 82 U/L (ref 40–150)
ALT SERPL W P-5'-P-CCNC: 35 U/L (ref 0–50)
ANION GAP SERPL CALCULATED.3IONS-SCNC: 11 MMOL/L (ref 7–15)
AST SERPL W P-5'-P-CCNC: 32 U/L (ref 0–45)
BILIRUB SERPL-MCNC: 0.2 MG/DL
BUN SERPL-MCNC: 22 MG/DL (ref 8–23)
CALCIUM SERPL-MCNC: 10 MG/DL (ref 8.8–10.2)
CHLORIDE SERPL-SCNC: 102 MMOL/L (ref 98–107)
CHOLEST SERPL-MCNC: 216 MG/DL
CREAT SERPL-MCNC: 0.59 MG/DL (ref 0.51–0.95)
CREAT UR-MCNC: 112.2 MG/DL
DEPRECATED HCO3 PLAS-SCNC: 28 MMOL/L (ref 22–29)
EGFRCR SERPLBLD CKD-EPI 2021: >90 ML/MIN/1.73M2
FASTING STATUS PATIENT QL REPORTED: NO
GLUCOSE SERPL-MCNC: 157 MG/DL (ref 70–99)
HDLC SERPL-MCNC: 90 MG/DL
LDLC SERPL CALC-MCNC: 102 MG/DL
MICROALBUMIN UR-MCNC: 24 MG/L
MICROALBUMIN/CREAT UR: 21.39 MG/G CR (ref 0–25)
NONHDLC SERPL-MCNC: 126 MG/DL
POTASSIUM SERPL-SCNC: 3.9 MMOL/L (ref 3.4–5.3)
PROT SERPL-MCNC: 7.7 G/DL (ref 6.4–8.3)
SODIUM SERPL-SCNC: 141 MMOL/L (ref 135–145)
TRIGL SERPL-MCNC: 118 MG/DL

## 2024-03-05 PROCEDURE — 36415 COLL VENOUS BLD VENIPUNCTURE: CPT | Performed by: NURSE PRACTITIONER

## 2024-03-05 PROCEDURE — 80061 LIPID PANEL: CPT | Performed by: NURSE PRACTITIONER

## 2024-03-05 PROCEDURE — 82570 ASSAY OF URINE CREATININE: CPT | Performed by: NURSE PRACTITIONER

## 2024-03-05 PROCEDURE — 80053 COMPREHEN METABOLIC PANEL: CPT | Performed by: NURSE PRACTITIONER

## 2024-03-05 PROCEDURE — 82043 UR ALBUMIN QUANTITATIVE: CPT | Performed by: NURSE PRACTITIONER

## 2024-03-05 PROCEDURE — G0108 DIAB MANAGE TRN  PER INDIV: HCPCS | Performed by: DIETITIAN, REGISTERED

## 2024-03-05 PROCEDURE — 99214 OFFICE O/P EST MOD 30 MIN: CPT | Performed by: NURSE PRACTITIONER

## 2024-03-05 RX ORDER — LISINOPRIL 10 MG/1
10 TABLET ORAL DAILY
Qty: 90 TABLET | Refills: 1 | Status: SHIPPED | OUTPATIENT
Start: 2024-03-05

## 2024-03-05 RX ORDER — INSULIN GLARGINE-YFGN 100 [IU]/ML
18 INJECTION, SOLUTION SUBCUTANEOUS AT BEDTIME
COMMUNITY
Start: 2024-02-15

## 2024-03-05 RX ORDER — INSULIN ASPART 100 [IU]/ML
INJECTION, SOLUTION INTRAVENOUS; SUBCUTANEOUS
COMMUNITY
Start: 2024-01-29

## 2024-03-05 RX ORDER — ACYCLOVIR 400 MG/1
TABLET ORAL
Qty: 3 EACH | Refills: 5 | Status: SHIPPED | OUTPATIENT
Start: 2024-03-05

## 2024-03-05 ASSESSMENT — PAIN SCALES - GENERAL
PAINLEVEL: NO PAIN (1)
PAINLEVEL: NO PAIN (0)

## 2024-03-05 NOTE — LETTER
3/5/2024        RE: Lakeisha Herman  [address]  [place] [address]        Diabetes Self-Management Education & Support    Presents for: Individual review    Type of Service: In Person Visit    ASSESSMENT:  Pt is here today as she was started on insulin therapy during recent hospitalization for toe amputation.  She is seeing wound care and reports toe is healing.  Pt is wearing a boot today.  Her daughter in law is present with her and seems to be very supportive.  She is taking both long acting (Semglee) and short acting (Novolog) insulin and glucose levels have improved much - most in target today with average for last eight days of 126.  She denies any feelings of hypoglycemia beyond one overnight test at 64.  Unfortunately she did not refrigerate her Semglee and insurance will not pay for more yet.  Her plan is to purchase Lantus for 35.00 until Semglee can be refilled.  Pt has been seen by this writer in the past and seems much more motivated today than she has been in the past.      Patient's most recent   Lab Results   Component Value Date    A1C 12.3 05/24/2023    A1C 12.1 12/16/2020     is not meeting goal of <7.0    Diabetes knowledge and skills assessment:   Patient is knowledgeable in diabetes management concepts related to: Healthy Eating    Education today focused on the following diabetes management concepts: Use of CGM for monitoring, insulin dosing, target glucose levels.     Based on learning assessment above, most appropriate setting for further diabetes education would be: Individual setting.      PLAN  Pt will continue to work on healthy food choices.   Dexcom G7 ordered via [employer].  Pt will call or My Chart once Dexcom is set up so data can be shared.    Continue current dose of Semglee/Lantus.  Continue current correction scale of Novolog before breakfast/lunch/supper but stop bedtime dose.      Follow-up: Once pt is wearing Dexcom G7    See Care Plan for co-developed, patient-state  "behavior change goals.  AVS provided for patient today.    Education Materials Provided:  Dexcom G7 information    SUBJECTIVE/OBJECTIVE:  Presents for: Individual review  Accompanied by: Self, Other (Daughter in law)  Diabetes education in the past 24mo: No  Focus of Visit: Assistance w/ making life changes  Diabetes type: Type 2  Date of diagnosis: Pt is unsure.  Disease course: Improving  How confident are you filling out medical forms by yourself:: Quite a bit  Diabetes management related comments/concerns: Insulin and glucose monitoring questions.  Transportation concerns: No  Difficulty affording diabetes medication?: No  Difficulty affording diabetes testing supplies?: No  Other concerns:: None  Cultural Influences/Ethnic Background:  Not  or     Diabetes Symptoms & Complications:  Diabetes Related Symptoms: Slow healing wounds  Weight trend: Stable  Symptom course: Stable  Disease course: Improving  Complications assessed today?: Yes  CVA: No  Heart disease: No  Nephropathy: No  Retinopathy: Yes    Patient Problem List and Family Medical History reviewed for relevant medical history, current medical status, and diabetes risk factors.    Vitals:  /91   Pulse 96   Resp 16   Ht 1.689 m (5' 6.5\")   Wt 78.5 kg (173 lb 1.6 oz)   SpO2 99%   BMI 27.52 kg/m    Estimated body mass index is 27.52 kg/m  as calculated from the following:    Height as of this encounter: 1.689 m (5' 6.5\").    Weight as of this encounter: 78.5 kg (173 lb 1.6 oz).   Last 3 BP:   BP Readings from Last 3 Encounters:   03/05/24 139/91   06/12/23 132/84   05/24/23 (!) 147/86       History   Smoking Status     Never   Smokeless Tobacco     Never       Labs:  Lab Results   Component Value Date    A1C 12.3 05/24/2023    A1C 12.1 12/16/2020     Lab Results   Component Value Date     05/24/2023     01/13/2021     Lab Results   Component Value Date    LDL 80 05/24/2023    LDL 96 01/13/2021     HDL Cholesterol " "  Date Value Ref Range Status   01/13/2021 92 >49 mg/dL Final     Direct Measure HDL   Date Value Ref Range Status   05/24/2023 90 >=50 mg/dL Final   ]  GFR Estimate   Date Value Ref Range Status   05/24/2023 >90 >60 mL/min/1.73m2 Final     Comment:     eGFR calculated using 2021 CKD-EPI equation.   01/13/2021 >90 >60 mL/min/[1.73_m2] Final     Comment:     Non  GFR Calc  Starting 12/18/2018, serum creatinine based estimated GFR (eGFR) will be   calculated using the Chronic Kidney Disease Epidemiology Collaboration   (CKD-EPI) equation.       GFR Estimate If Black   Date Value Ref Range Status   01/13/2021 >90 >60 mL/min/[1.73_m2] Final     Comment:      GFR Calc  Starting 12/18/2018, serum creatinine based estimated GFR (eGFR) will be   calculated using the Chronic Kidney Disease Epidemiology Collaboration   (CKD-EPI) equation.       Lab Results   Component Value Date    CR 0.60 05/24/2023    CR 0.58 01/13/2021     No results found for: \"MICROALBUMIN\"    Healthy Eating:  Healthy Eating Assessed Today: Yes  Cultural/Anglican diet restrictions?: No  Do you have any food allergies or intolerances?: No  Meal planning/habits: Carb counting (45 grams/meal)  Who cooks/prepares meals for you?: Self, Other (Daughter in law)  Who purchases food in  your home?: Self, Other (Daughter in law)  How many times a week on average do you eat food made away from home (restaurant/take-out)?: 0  Meals include: Breakfast, Lunch, Dinner  Breakfast: omelets or low carb toast with sugar free peanut butter  Lunch: veggies, Atkins bar, low carb greek yogurt, cheese stick  Dinner: lot of fish, sometimes quinoa, veggies  Snacks: low sugar ice cream  Other: 45 grams/meal  Beverages: Other (see Comments), Water  Please elaborate:: Diet Snapple  Has patient met with a dietitian in the past?: Yes    Being Active:  Being Active Assessed Today: Yes  Exercise:: Currently not exercising  Barrier to exercise: Physical " limitation (Recent toe amputation.  Foot is in a boot.)    Monitoring:  Monitoring Assessed Today: Yes  Did patient bring glucose meter to appointment? : Yes  Blood Glucose Meter: Accu-chek, One Touch (Pt has an Accu-Chek Guide and a One Touch Ultra.  She prefers the Ultra but insurance covers the Accu-Chek.)  Times checking blood sugar at home (number): Other (see Comments)  Please elaborate:: 3-5x/day  Blood glucose trend: Decreasing  Fasting-92, 96, 107, 95, 98, 130, 88, 71  Before wgutd-  [id number]  Two week average is 126.     Taking Medications:  Diabetes Medication(s)       Biguanides       metFORMIN (GLUCOPHAGE) 1000 MG tablet Take 1 tablet (1,000 mg) by mouth 2 times daily (with meals)       Insulin       Insulin Glargine-yfgn 100 UNIT/ML SOPN Inject 18 Units Subcutaneous at bedtime     NOVOLOG FLEXPEN 100 UNIT/ML soln INJECT 1-16 UNITS SUBCUTANEOUSLY 4 TIMES DAILY (WITH MEALS, AND BEDTIME) FOR 30 DAYS            Taking Medication Assessed Today: Yes  Current Treatments: Diet, Insulin Injections, Oral Medication (taken by mouth) (Metformin 1000 mg bid, Semglee 18 units hs, Novolog correction scale:  none, 140-180 3 units, 181-240 4 units, 241-300 6 units, 301-350 8 units, 351-400 10 units, >400 12 units)  Dose schedule: Pre-breakfast, Pre-lunch, Pre-dinner, At bedtime  Given by: Patient  Injection/Infusion sites: Abdomen  Problems taking diabetes medications regularly?: No  Diabetes medication side effects?: No    Problem Solving:  Problem Solving Assessed Today: Yes  Is the patient at risk for hypoglycemia?: Yes  Hypoglycemia Frequency: Other (1x since beginning insulin therapy)  Hypoglycemia Treatment: Other food    Hypoglycemia Symptoms  Hypoglycemia: Feeling shaky, Headaches    Reducing Risks:  Reducing Risks Assessed Today: Yes  Diabetes Risks: Age over 45 years (Baby >9#)  CAD Risks: Diabetes Mellitus  Has dilated eye exam at least once a year?: Yes  Feet checked by healthcare  provider in the last year?: Yes    Healthy Coping:  Healthy Coping Assessed Today: Yes  Emotional response to diabetes: Ready to learn, Concern for health and well-being, Acceptance  Informal Support system:: Family  Stage of change: ACTION (Actively working towards change)  Patient Activation Measure Survey Score:      8/12/2020    12:00 PM   DERIAN Score (Last Two)   DERIAN Raw Score 33   Activation Score 65.8   DERIAN Level 3       Time Spent: 60 minutes  Encounter Type: Individual    Any diabetes medication dose changes were made via the CDE Protocol per the patient's referring provider. A copy of this encounter was shared with the provider.       Sincerely,        Roro Armenta RD

## 2024-03-05 NOTE — PATIENT INSTRUCTIONS
-Continue to work on healthy food choices - 45 grams carbohydrate per meal, 15-30 grams/snack.  -Use Dexcom G7 for monitoring when you are able to obtain it.   -Target glucose levels are we want most between .    -If you are using a meter targets are fasting/before meals , 2 hours after a meal < 180.  -Continue current dose of Semglee/Lantus.  -Continue current sliding scale Novolog but only take it before breakfast/lunch/supper.  Stop bedtime.   -Let me know when you are wearing the Dexcom so we can send you an email to share your data.    -We will follow up once you are wearing the Dexcom so we can download your report for review.   -MANINDER Valdovinos, [facility] [phone].

## 2024-03-05 NOTE — PROGRESS NOTES
Diabetes Self-Management Education & Support    Presents for: Individual review    Type of Service: In Person Visit    ASSESSMENT:  Pt is here today as she was started on insulin therapy during recent hospitalization for toe amputation.  She is seeing wound care and reports toe is healing.  Pt is wearing a boot today.  Her daughter in law is present with her and seems to be very supportive.  She is taking both long acting (Semglee) and short acting (Novolog) insulin and glucose levels have improved much - most in target today with average for last eight days of 126.  She denies any feelings of hypoglycemia beyond one overnight test at 64.  Unfortunately she did not refrigerate her Semglee and insurance will not pay for more yet.  Her plan is to purchase Lantus for 35.00 until Semglee can be refilled.  Pt has been seen by this writer in the past and seems much more motivated today than she has been in the past.      Patient's most recent   Lab Results   Component Value Date    A1C 12.3 05/24/2023    A1C 12.1 12/16/2020     is not meeting goal of <7.0    Diabetes knowledge and skills assessment:   Patient is knowledgeable in diabetes management concepts related to: Healthy Eating    Education today focused on the following diabetes management concepts: Use of CGM for monitoring, insulin dosing, target glucose levels.     Based on learning assessment above, most appropriate setting for further diabetes education would be: Individual setting.      PLAN  Pt will continue to work on healthy food choices.   Dexcom G7 ordered via [employer]  Pt will call or My Chart once Dexcom is set up so data can be shared.    Continue current dose of Semglee/Lantus.  Continue current correction scale of Novolog before breakfast/lunch/supper but stop bedtime dose.      Follow-up: Once pt is wearing Dexcom G7    See Care Plan for co-developed, patient-state behavior change goals.  AVS provided for patient today.    Education Materials  "Provided:  Dexcom G7 information    SUBJECTIVE/OBJECTIVE:  Presents for: Individual review  Accompanied by: Self, Other (Daughter in law)  Diabetes education in the past 24mo: No  Focus of Visit: Assistance w/ making life changes  Diabetes type: Type 2  Date of diagnosis: Pt is unsure.  Disease course: Improving  How confident are you filling out medical forms by yourself:: Quite a bit  Diabetes management related comments/concerns: Insulin and glucose monitoring questions.  Transportation concerns: No  Difficulty affording diabetes medication?: No  Difficulty affording diabetes testing supplies?: No  Other concerns:: None  Cultural Influences/Ethnic Background:  Not  or     Diabetes Symptoms & Complications:  Diabetes Related Symptoms: Slow healing wounds  Weight trend: Stable  Symptom course: Stable  Disease course: Improving  Complications assessed today?: Yes  CVA: No  Heart disease: No  Nephropathy: No  Retinopathy: Yes    Patient Problem List and Family Medical History reviewed for relevant medical history, current medical status, and diabetes risk factors.    Vitals:  /91   Pulse 96   Resp 16   Ht 1.689 m (5' 6.5\")   Wt 78.5 kg (173 lb 1.6 oz)   SpO2 99%   BMI 27.52 kg/m    Estimated body mass index is 27.52 kg/m  as calculated from the following:    Height as of this encounter: 1.689 m (5' 6.5\").    Weight as of this encounter: 78.5 kg (173 lb 1.6 oz).   Last 3 BP:   BP Readings from Last 3 Encounters:   03/05/24 139/91   06/12/23 132/84   05/24/23 (!) 147/86       History   Smoking Status    Never   Smokeless Tobacco    Never       Labs:  Lab Results   Component Value Date    A1C 12.3 05/24/2023    A1C 12.1 12/16/2020     Lab Results   Component Value Date     05/24/2023     01/13/2021     Lab Results   Component Value Date    LDL 80 05/24/2023    LDL 96 01/13/2021     HDL Cholesterol   Date Value Ref Range Status   01/13/2021 92 >49 mg/dL Final     Direct Measure HDL " "  Date Value Ref Range Status   05/24/2023 90 >=50 mg/dL Final   ]  GFR Estimate   Date Value Ref Range Status   05/24/2023 >90 >60 mL/min/1.73m2 Final     Comment:     eGFR calculated using 2021 CKD-EPI equation.   01/13/2021 >90 >60 mL/min/[1.73_m2] Final     Comment:     Non  GFR Calc  Starting 12/18/2018, serum creatinine based estimated GFR (eGFR) will be   calculated using the Chronic Kidney Disease Epidemiology Collaboration   (CKD-EPI) equation.       GFR Estimate If Black   Date Value Ref Range Status   01/13/2021 >90 >60 mL/min/[1.73_m2] Final     Comment:      GFR Calc  Starting 12/18/2018, serum creatinine based estimated GFR (eGFR) will be   calculated using the Chronic Kidney Disease Epidemiology Collaboration   (CKD-EPI) equation.       Lab Results   Component Value Date    CR 0.60 05/24/2023    CR 0.58 01/13/2021     No results found for: \"MICROALBUMIN\"    Healthy Eating:  Healthy Eating Assessed Today: Yes  Cultural/Gnosticism diet restrictions?: No  Do you have any food allergies or intolerances?: No  Meal planning/habits: Carb counting (45 grams/meal)  Who cooks/prepares meals for you?: Self, Other (Daughter in law)  Who purchases food in  your home?: Self, Other (Daughter in law)  How many times a week on average do you eat food made away from home (restaurant/take-out)?: 0  Meals include: Breakfast, Lunch, Dinner  Breakfast: omelets or low carb toast with sugar free peanut butter  Lunch: veggies, Atkins bar, low carb greek yogurt, cheese stick  Dinner: lot of fish, sometimes quinoa, veggies  Snacks: low sugar ice cream  Other: 45 grams/meal  Beverages: Other (see Comments), Water  Please elaborate:: Diet Snapple  Has patient met with a dietitian in the past?: Yes    Being Active:  Being Active Assessed Today: Yes  Exercise:: Currently not exercising  Barrier to exercise: Physical limitation (Recent toe amputation.  Foot is in a boot.)    Monitoring:  Monitoring " Assessed Today: Yes  Did patient bring glucose meter to appointment? : Yes  Blood Glucose Meter: Accu-chek, One Touch (Pt has an Accu-Chek Guide and a One Touch Ultra.  She prefers the Ultra but insurance covers the Accu-Chek.)  Times checking blood sugar at home (number): Other (see Comments)  Please elaborate:: 3-5x/day  Blood glucose trend: Decreasing  Fasting-92, 96, 107, 95, 98, 130, 88, 71  Before dosco-  [id number]  Two week average is 126.     Taking Medications:  Diabetes Medication(s)       Biguanides       metFORMIN (GLUCOPHAGE) 1000 MG tablet Take 1 tablet (1,000 mg) by mouth 2 times daily (with meals)       Insulin       Insulin Glargine-yfgn 100 UNIT/ML SOPN Inject 18 Units Subcutaneous at bedtime     NOVOLOG FLEXPEN 100 UNIT/ML soln INJECT 1-16 UNITS SUBCUTANEOUSLY 4 TIMES DAILY (WITH MEALS, AND BEDTIME) FOR 30 DAYS            Taking Medication Assessed Today: Yes  Current Treatments: Diet, Insulin Injections, Oral Medication (taken by mouth) (Metformin 1000 mg bid, Semglee 18 units hs, Novolog correction scale:  none, 140-180 3 units, 181-240 4 units, 241-300 6 units, 301-350 8 units, 351-400 10 units, >400 12 units)  Dose schedule: Pre-breakfast, Pre-lunch, Pre-dinner, At bedtime  Given by: Patient  Injection/Infusion sites: Abdomen  Problems taking diabetes medications regularly?: No  Diabetes medication side effects?: No    Problem Solving:  Problem Solving Assessed Today: Yes  Is the patient at risk for hypoglycemia?: Yes  Hypoglycemia Frequency: Other (1x since beginning insulin therapy)  Hypoglycemia Treatment: Other food    Hypoglycemia Symptoms  Hypoglycemia: Feeling shaky, Headaches    Reducing Risks:  Reducing Risks Assessed Today: Yes  Diabetes Risks: Age over 45 years (Baby >9#)  CAD Risks: Diabetes Mellitus  Has dilated eye exam at least once a year?: Yes  Feet checked by healthcare provider in the last year?: Yes    Healthy Coping:  Healthy Coping Assessed Today:  Yes  Emotional response to diabetes: Ready to learn, Concern for health and well-being, Acceptance  Informal Support system:: Family  Stage of change: ACTION (Actively working towards change)  Patient Activation Measure Survey Score:      8/12/2020    12:00 PM   DERIAN Score (Last Two)   DERIAN Raw Score 33   Activation Score 65.8   DERIAN Level 3       Time Spent: 60 minutes  Encounter Type: Individual    Any diabetes medication dose changes were made via the CDE Protocol per the patient's referring provider. A copy of this encounter was shared with the provider.

## 2024-03-15 ENCOUNTER — TELEPHONE (OUTPATIENT)
Dept: FAMILY MEDICINE | Facility: OTHER | Age: 59
End: 2024-03-15

## 2024-03-15 NOTE — TELEPHONE ENCOUNTER
2:54 PM    Reason for Call: Phone Call    Description: Lakeisha had a missed call today and she is wondering if [facility] team is calling her today?     Was an appointment offered for this call? No  If yes : Appointment type              Date    Preferred method for responding to this message: Telephone Call  What is your phone number ? [phone]    If we cannot reach you directly, may we leave a detailed response at the number you provided? Yes    Can this message wait until your PCP/provider returns, if available today? YES, No

## 2024-03-19 ENCOUNTER — PATIENT OUTREACH (OUTPATIENT)
Dept: EDUCATION SERVICES | Facility: HOSPITAL | Age: 59
End: 2024-03-19

## 2024-04-11 ENCOUNTER — TELEPHONE (OUTPATIENT)
Dept: EDUCATION SERVICES | Facility: HOSPITAL | Age: 59
End: 2024-04-11

## 2024-04-11 NOTE — TELEPHONE ENCOUNTER
Attempt # 1  Outcome: Left Message   Comment: LVM for pt to schedule with Roro Armenta after 4/23

## 2024-04-11 NOTE — TELEPHONE ENCOUNTER
----- Message from Roro Armenta RD sent at 4/11/2024  8:17 AM CDT -----  Please call pt to schedule appointment for glucose review/A1c check after April 23rd.   Thanks!

## 2024-04-16 NOTE — TELEPHONE ENCOUNTER
Attempt # 2  Outcome: Left Message          Comment: LVM for pt to schedule with Roro Armenta after 4/23

## 2024-04-28 ENCOUNTER — HEALTH MAINTENANCE LETTER (OUTPATIENT)
Age: 59
End: 2024-04-28

## 2024-06-12 ENCOUNTER — TRANSFERRED RECORDS (OUTPATIENT)
Dept: HEALTH INFORMATION MANAGEMENT | Facility: CLINIC | Age: 59
End: 2024-06-12

## 2024-06-12 LAB — RETINOPATHY: POSITIVE

## 2024-07-07 ENCOUNTER — HEALTH MAINTENANCE LETTER (OUTPATIENT)
Age: 59
End: 2024-07-07

## 2024-08-19 DIAGNOSIS — E11.65 UNCONTROLLED TYPE 2 DIABETES MELLITUS WITH HYPERGLYCEMIA (H): ICD-10-CM

## 2024-08-19 NOTE — TELEPHONE ENCOUNTER
Metformin  Last Written Prescription Date: 3/5/24  Last Fill Quantity: 180 # of Refills: 0  Last Office Visit: 3/5/24

## 2024-09-15 ENCOUNTER — HEALTH MAINTENANCE LETTER (OUTPATIENT)
Age: 59
End: 2024-09-15

## 2024-10-09 ENCOUNTER — TELEPHONE (OUTPATIENT)
Dept: FAMILY MEDICINE | Facility: OTHER | Age: 59
End: 2024-10-09

## 2024-10-09 DIAGNOSIS — E11.65 TYPE 2 DIABETES MELLITUS WITH HYPERGLYCEMIA, WITH LONG-TERM CURRENT USE OF INSULIN (H): ICD-10-CM

## 2024-10-09 DIAGNOSIS — Z79.4 TYPE 2 DIABETES MELLITUS WITH HYPERGLYCEMIA, WITH LONG-TERM CURRENT USE OF INSULIN (H): ICD-10-CM

## 2024-10-09 RX ORDER — ACYCLOVIR 400 MG/1
TABLET ORAL
Qty: 1 EACH | Refills: 0 | OUTPATIENT
Start: 2024-10-09

## 2024-10-09 NOTE — TELEPHONE ENCOUNTER
Continuous Blood Gluc Sensor (DEXCOM G7 SENSOR) MISC       Last Written Prescription Date:  3/5/24  Last Fill Quantity: 3,   # refills: 5  Last Office Visit: 3/5/24  Future Office visit:       Routing refill request to provider for review/approval because:  Drug not on the [facility], P or [facility] refill protocol or controlled substance

## 2024-10-11 NOTE — TELEPHONE ENCOUNTER
Attempt # 2  Outcome: Left Message   Comment: LVM for pt to call and schedule med review with PCP     Satisfactory

## 2024-10-15 NOTE — TELEPHONE ENCOUNTER
Attempt # 3  Outcome: Left Message   Comment: LVM for pt to call and schedule med review w/PCP. Letter sent as max attempts have been reached.

## 2024-10-23 NOTE — TELEPHONE ENCOUNTER
Reason for call:  Medication    Patient scheduled for next available with PCP. Would like insulin and any other medications needed to get by until they are seen.   Have you contacted your pharmacy? Yes   If patient has contacted Pharmacy and it has been over 72hrs, continue to #2  Medication     insulin  What Pharmacy do you use? [facility]      (Please note that the turn-around-time for prescriptions is 72 business hours; I am sending your request at this time. SEND TO appropriate Care Team Pool )

## 2024-10-24 RX ORDER — INSULIN GLARGINE-YFGN 100 [IU]/ML
18 INJECTION, SOLUTION SUBCUTANEOUS AT BEDTIME
Qty: 15 ML | Status: CANCELLED | OUTPATIENT
Start: 2024-10-24

## 2024-10-24 NOTE — TELEPHONE ENCOUNTER
Insulin      Last Written Prescription Date:  historical  Last Fill Quantity: 0,   # refills: 0  Last Office Visit: 3/5/2024  Future Office visit:    Next 5 appointments (look out 90 days)      Nov 25, 2024 10:30 AM  (Arrive by 10:15 AM)  Provider Visit with Genesis Falk NP  [facility] [place] ([facility] [place] ) [address]  [place] [address]  [phone]             Routing refill request to provider for review/approval because:

## 2024-10-28 NOTE — TELEPHONE ENCOUNTER
Patient is waiting for her prescriptions to be filled and she is wondering when this will be done?     Phone [phone]

## 2024-10-29 RX ORDER — INSULIN ASPART 100 [IU]/ML
1-16 INJECTION, SOLUTION INTRAVENOUS; SUBCUTANEOUS
Qty: 15 ML | Refills: 0 | Status: SHIPPED | OUTPATIENT
Start: 2024-10-29

## 2024-10-29 NOTE — TELEPHONE ENCOUNTER
Pended Novolog Flexpen. Last dispensed on 1/29/24 by Magda Soto. Inject 1-16 units subcutaneously 4 times daily with meals and at bedtime.   Last office visit was 3/5/24.   Scheduled for follow up 11/25/24.

## 2024-10-29 NOTE — TELEPHONE ENCOUNTER
Attempted to call patient to notify. No answer. LVM requesting she call the clinic back to be notified Novolog was sent in to pharmacy.

## 2024-11-13 ENCOUNTER — OFFICE VISIT (OUTPATIENT)
Dept: FAMILY MEDICINE | Facility: OTHER | Age: 59
End: 2024-11-13
Attending: NURSE PRACTITIONER
Payer: COMMERCIAL

## 2024-11-13 ENCOUNTER — ANCILLARY PROCEDURE (OUTPATIENT)
Dept: GENERAL RADIOLOGY | Facility: OTHER | Age: 59
End: 2024-11-13
Attending: NURSE PRACTITIONER
Payer: COMMERCIAL

## 2024-11-13 VITALS
DIASTOLIC BLOOD PRESSURE: 80 MMHG | HEIGHT: 67 IN | TEMPERATURE: 97.4 F | WEIGHT: 191 LBS | BODY MASS INDEX: 29.98 KG/M2 | HEART RATE: 89 BPM | SYSTOLIC BLOOD PRESSURE: 130 MMHG | OXYGEN SATURATION: 98 %

## 2024-11-13 DIAGNOSIS — Z12.11 SPECIAL SCREENING FOR MALIGNANT NEOPLASMS, COLON: ICD-10-CM

## 2024-11-13 DIAGNOSIS — R13.10 DYSPHAGIA, UNSPECIFIED TYPE: ICD-10-CM

## 2024-11-13 DIAGNOSIS — R05.1 ACUTE COUGH: ICD-10-CM

## 2024-11-13 DIAGNOSIS — R22.30 MASS OF PALM: Primary | ICD-10-CM

## 2024-11-13 PROCEDURE — 71046 X-RAY EXAM CHEST 2 VIEWS: CPT | Mod: TC | Performed by: RADIOLOGY

## 2024-11-13 RX ORDER — CETIRIZINE HYDROCHLORIDE 10 MG/1
10 TABLET ORAL DAILY
Qty: 90 TABLET | Refills: 0 | Status: SHIPPED | OUTPATIENT
Start: 2024-11-13

## 2024-11-13 ASSESSMENT — PAIN SCALES - GENERAL: PAINLEVEL_OUTOF10: NO PAIN (0)

## 2024-11-13 NOTE — PROGRESS NOTES
"  Assessment & Plan     (R22.30) Mass of palm  (primary encounter diagnosis)  Comment: irregular tissue at the base of left ring finger-feels like inflamed tendon, tender, no erythema, types for work and causes issues  Plan: Orthopedic  Referral        Will send to ortho.     (Z12.11) Special screening for malignant neoplasms, colon  Plan: [facility]([employer])        Will notify patient of the results when available and intervene accordingly.     (R05.1) Acute cough  Comment: dry cough and thick phlegm times 4 weeks, no fevers, lungs clear  Plan: XR Chest 2 Views, cetirizine (ZYRTEC) 10 MG tablet        Seems like allergies. Will start daily Zyrtec and reassess at her follow-up in 2 weeks. Will also order CXR. Will notify patient of the results when available and intervene accordingly.     (R13.10) Dysphagia, unspecified type  Comment: feels dry food is getting stuck  Plan: Speech Therapy  Referral, XR Video Swallow with SLP or OT        Will send to speech therapy for swallow evaluation.           BMI  Estimated body mass index is 30.37 kg/m  as calculated from the following:    Height as of this encounter: 1.689 m (5' 6.5\").    Weight as of this encounter: 86.6 kg (191 lb).   Weight management plan: Discussed healthy diet and exercise guidelines    The longitudinal plan of care for the diagnosis(es)/condition(s) as documented were addressed during this visit. Due to the added complexity in care, I will continue to support Lakeisha in the subsequent management and with ongoing continuity of care.    Subjective   Lakeisha is a 59 year old, presenting for the following health issues:  Diabetes, Lipids, Hypertension, and mass in left palm    History of Present Illness       Reason for visit:  Hard lump in left palm  Symptom onset:  1-3 days ago  Symptom intensity:  Moderate  Symptom progression:  Worsening  Had these symptoms before:  No She is missing 2 dose(s) of medications per week.  She is not " "taking prescribed medications regularly due to remembering to take.       Concern - Mass on left palm  Onset: one week  Description: hard mass on left palm, types all day and then causes her pain  Intensity: moderate   Progression of Symptoms:  same  Accompanying Signs & Symptoms: no fevers, no erythema  Previous history of similar problem: none  Precipitating factors:        Worsened by: none  Alleviating factors:        Improved by: none  Therapies tried and outcome: None      She has also had a cough for one month. Dry. Some thick phelgm. Often causing her to vomit. No nasal congestion or a sore throat. No fevers. No chest pain or shortness of breath. She is not taking anything for her symptoms.     Food occasionally gets stuck in throat. Worse when the food is dry. No reflux or heartburn.        Review of Systems  Constitutional, HEENT, cardiovascular, pulmonary, gi and gu systems are negative, except as otherwise noted.      Objective    /80 (BP Location: Right arm, Patient Position: Sitting, Cuff Size: Adult Regular)   Pulse 89   Temp 97.4  F (36.3  C) (Tympanic)   Ht 1.689 m (5' 6.5\")   Wt 86.6 kg (191 lb)   SpO2 98%   BMI 30.37 kg/m    Body mass index is 30.37 kg/m .  Physical Exam   GENERAL: alert and no distress  EYES: Eyes grossly normal to inspection, PERRL and conjunctivae and sclerae normal  HENT: ear canals and TM's normal, nose and mouth without ulcers or lesions  NECK: no adenopathy, no asymmetry, masses, or scars  RESP: lungs clear to auscultation - no rales, rhonchi or wheezes  CV: regular rate and rhythm, no murmur, click or rub, no peripheral edema  NEURO: Normal strength and tone, mentation intact and speech normal  PSYCH: mentation appears normal, affect normal/bright  LEFT PALM-raised oval mass at the base of her left ring finger, no erythema, no trigger finger, full ROM of all fingers, brisk cap refill    CXR in process        Signed Electronically by: Genesis Falk NP    "

## 2024-12-04 ENCOUNTER — ANCILLARY PROCEDURE (OUTPATIENT)
Dept: GENERAL RADIOLOGY | Facility: OTHER | Age: 59
End: 2024-12-04
Attending: ORTHOPAEDIC SURGERY
Payer: COMMERCIAL

## 2024-12-04 ENCOUNTER — OFFICE VISIT (OUTPATIENT)
Dept: ORTHOPEDICS | Facility: OTHER | Age: 59
End: 2024-12-04
Attending: ORTHOPAEDIC SURGERY
Payer: COMMERCIAL

## 2024-12-04 VITALS
BODY MASS INDEX: 29.82 KG/M2 | SYSTOLIC BLOOD PRESSURE: 148 MMHG | OXYGEN SATURATION: 98 % | DIASTOLIC BLOOD PRESSURE: 76 MMHG | HEART RATE: 93 BPM | TEMPERATURE: 97.3 F | HEIGHT: 67 IN | WEIGHT: 190 LBS

## 2024-12-04 DIAGNOSIS — R22.32 MASS OF FINGER OF LEFT HAND: Primary | ICD-10-CM

## 2024-12-04 DIAGNOSIS — R22.32 MASS OF FINGER OF LEFT HAND: ICD-10-CM

## 2024-12-04 DIAGNOSIS — R22.30 MASS OF PALM: ICD-10-CM

## 2024-12-04 PROCEDURE — 73140 X-RAY EXAM OF FINGER(S): CPT | Mod: TC | Performed by: RADIOLOGY

## 2024-12-04 ASSESSMENT — PAIN SCALES - GENERAL: PAINLEVEL_OUTOF10: NO PAIN (0)

## 2024-12-04 NOTE — PATIENT INSTRUCTIONS
Thank you for allowing Dr. Souleymane Zuñiga and his team to participate in your care. Please call our health unit coordinator at [phone] with scheduling questions or the nurse at [phone] with any other questions or concerns.        You may call the Orthopedic nurse at [phone] with any questions.

## 2024-12-05 NOTE — PROGRESS NOTES
ORTHOPEDIC CLINIC CONSULT    Referred by:     Chief Complaint: Lakeisha Herman is a 59 year old female who is being seen for   Chief Complaint   Patient presents with    Consult     Mass of palm        History of Present Illness:   Patient is a 59-year-old female presents for left hand palmar nodule along the fourth ray.  She notes she just noticed it around November time is not having any pain or discomfort or limitations but notes the nodule.  She presents for evaluation of the palmar nodule.  Does not recall any particular trauma or injury and does not recall noting anybody in family that may have Dupuytren's that she can think of.    Patient's past medical, surgical, social and family histories reviewed.     Past Medical History:   Diagnosis Date    Diabetes (H)        Past Surgical History:   Procedure Laterality Date    cyst removed      neck    XR WRIST SURGERY JACY LEFT      unsure specifics       Home Medications:  Prior to Admission medications    Medication Sig Start Date End Date Taking? Authorizing Provider   aspirin (ASA) 81 MG EC tablet Take 1 tablet (81 mg) by mouth daily  Patient not taking: Reported on 3/5/2024 5/24/23   Genesis Falk, NP   blood glucose (ACCU-CHEK GUIDE) test strip Use to test blood sugar 2 times daily.  Patient not taking: Reported on 11/13/2024 5/24/23   Genesis Falk, NP   blood glucose monitoring (SOFTCLIX) lancets Use to test blood sugar 2 times daily.  Patient not taking: Reported on 11/13/2024 5/24/23   Genesis Falk, NP   cetirizine (ZYRTEC) 10 MG tablet Take 1 tablet (10 mg) by mouth daily. 11/13/24   Genesis Falk, NP   Continuous Blood Gluc Sensor (DEXCOM G7 SENSOR) MISC Change every 10 days.  Patient not taking: Reported on 11/13/2024 3/5/24   Genesis Falk, NP   Insulin Glargine-yfgn 100 UNIT/ML SOPN Inject 18 Units Subcutaneous at bedtime 2/15/24   Reported, Patient   insulin pen needle (32G X 4 MM) 32G X 4 MM miscellaneous Use 4 pen needles daily. 3/5/24    "Genesis Falk, NP   lisinopril (ZESTRIL) 10 MG tablet Take 1 tablet (10 mg) by mouth daily 3/5/24   Genesis Falk NP   metFORMIN (GLUCOPHAGE) 1000 MG tablet TAKE 1 TABLET BY MOUTH TWICE DAILY WITH MEALS 8/19/24   Genesis Falk NP   NOVOLOG FLEXPEN 100 UNIT/ML soln Inject 1-16 Units subcutaneously 4 times daily (with meals and nightly). 10/29/24   Genesis Falk NP   rosuvastatin (CRESTOR) 10 MG tablet Take 1 tablet (10 mg) by mouth daily 5/26/23   Genesis Falk NP       Allergies   Allergen Reactions    Pcn [Penicillins] Rash       Social History     Occupational History    Not on file   Tobacco Use    Smoking status: Never    Smokeless tobacco: Never   Vaping Use    Vaping status: Never Used   Substance and Sexual Activity    Alcohol use: Never    Drug use: Never    Sexual activity: Not Currently     Partners: Male     Birth control/protection: Condom       Family History   Problem Relation Age of Onset    Hypoglycemia Mother     Cerebrovascular Disease Maternal Grandmother     Dementia Maternal Grandmother        REVIEW OF SYSTEMS            Physical Exam:    Vitals: BP (!) 148/76 (BP Location: Left arm, Patient Position: Sitting, Cuff Size: Adult Regular)   Pulse 93   Temp 97.3  F (36.3  C) (Tympanic)   Ht 1.702 m (5' 7\")   Wt 86.2 kg (190 lb)   SpO2 98%   BMI 29.76 kg/m    BMI= Body mass index is 29.76 kg/m .  On examination she is awake alert no to cooperative no apparent distress.  Her hand has full range of motion to flexion extension.  Just early onset of a little bit of dimpling in the hand with a palmar nodule along the fourth ray and palpation of it feels consistent with a Dupuytren's.  Although could be a fibrous scar nodule from some trauma but she cannot think of any trauma.  The size about 4 mm by about 6 mm has some early onset of the dimpling.  After looking at the right hand also has full range of motion has just a very slight evidence of early onset and in comparison to the left " hand.  No tenderness no contractures no fluctuance.  Radiographs: X-rays of the hand unremarkable no calcifications no fracture subluxations or spurs.  Unremarkable     Independent visualization of the films was made.         Impression:      ICD-10-CM    1. Mass of finger of left hand  R22.32 XR Finger Left G/E 2 VW (Clinic Performed)      2. Mass of palm  R22.30 Orthopedic  Referral          Plan: Pression plan left hand palmar nodule/mass it appears or palpates consistent with more of a Dupuytren's type lesion with a little bit early complications that could also be from a repetitive type trauma that developed some scarring or thickening in her palmar fascia but is in the parents and also looking at the contralateral side leads more towards Dupuytren's although she is not have any contractures at this time and only minimal areas of dimpling.  After lengthy discussion I discussed options at this time she would like to just watch it is not giving her any limitations not given any pain or discomfort.  If however it starts to change starts either because contractions get larger in size or starts to cause pain or limitations should definitely have it reevaluated.  She is comfortable this plan    All of the above pertinent physical exam and imaging modalities findings was reviewed with Lakeisha.    Return to clinic in as needed weeks.    Further imaging required none    Time spent with evaluation: 30 minutes    Souleymane Zuñiga MD  12/4/2024  9:41 PM

## 2024-12-11 ENCOUNTER — TRANSFERRED RECORDS (OUTPATIENT)
Dept: HEALTH INFORMATION MANAGEMENT | Facility: CLINIC | Age: 59
End: 2024-12-11

## 2024-12-11 LAB — RETINOPATHY: POSITIVE

## 2025-01-11 ENCOUNTER — HEALTH MAINTENANCE LETTER (OUTPATIENT)
Age: 60
End: 2025-01-11

## 2025-02-09 ENCOUNTER — HEALTH MAINTENANCE LETTER (OUTPATIENT)
Age: 60
End: 2025-02-09

## 2025-03-10 ENCOUNTER — TELEPHONE (OUTPATIENT)
Dept: FAMILY MEDICINE | Facility: OTHER | Age: 60
End: 2025-03-10

## 2025-03-13 NOTE — TELEPHONE ENCOUNTER
Attempt # 2: LVM with patient to call back to schedule annual physical with pcp per quality list. Letter sent.

## 2025-03-19 ENCOUNTER — OFFICE VISIT (OUTPATIENT)
Dept: FAMILY MEDICINE | Facility: OTHER | Age: 60
End: 2025-03-19
Attending: NURSE PRACTITIONER
Payer: COMMERCIAL

## 2025-03-19 ENCOUNTER — TELEPHONE (OUTPATIENT)
Dept: FAMILY MEDICINE | Facility: OTHER | Age: 60
End: 2025-03-19

## 2025-03-19 VITALS
TEMPERATURE: 97.3 F | WEIGHT: 193 LBS | SYSTOLIC BLOOD PRESSURE: 140 MMHG | OXYGEN SATURATION: 98 % | DIASTOLIC BLOOD PRESSURE: 90 MMHG | BODY MASS INDEX: 30.29 KG/M2 | HEART RATE: 91 BPM | HEIGHT: 67 IN

## 2025-03-19 DIAGNOSIS — E11.65 TYPE 2 DIABETES MELLITUS WITH HYPERGLYCEMIA, WITH LONG-TERM CURRENT USE OF INSULIN (H): ICD-10-CM

## 2025-03-19 DIAGNOSIS — I10 ESSENTIAL HYPERTENSION: ICD-10-CM

## 2025-03-19 DIAGNOSIS — E11.65 UNCONTROLLED TYPE 2 DIABETES MELLITUS WITH HYPERGLYCEMIA (H): ICD-10-CM

## 2025-03-19 DIAGNOSIS — J01.91 ACUTE RECURRENT SINUSITIS, UNSPECIFIED LOCATION: ICD-10-CM

## 2025-03-19 DIAGNOSIS — J06.9 ACUTE URI: Primary | ICD-10-CM

## 2025-03-19 DIAGNOSIS — Z79.4 TYPE 2 DIABETES MELLITUS WITH HYPERGLYCEMIA, WITH LONG-TERM CURRENT USE OF INSULIN (H): ICD-10-CM

## 2025-03-19 LAB
FLUAV RNA SPEC QL NAA+PROBE: NEGATIVE
FLUBV RNA RESP QL NAA+PROBE: NEGATIVE
RSV RNA SPEC NAA+PROBE: NEGATIVE
SARS-COV-2 RNA RESP QL NAA+PROBE: NEGATIVE

## 2025-03-19 RX ORDER — DOXYCYCLINE 100 MG/1
100 CAPSULE ORAL 2 TIMES DAILY
Qty: 14 CAPSULE | Refills: 0 | Status: SHIPPED | OUTPATIENT
Start: 2025-03-19 | End: 2025-03-26

## 2025-03-19 RX ORDER — LANCETS
EACH MISCELLANEOUS
Qty: 100 EACH | Refills: 3 | Status: SHIPPED | OUTPATIENT
Start: 2025-03-19

## 2025-03-19 RX ORDER — LISINOPRIL 10 MG/1
10 TABLET ORAL DAILY
Qty: 30 TABLET | Refills: 0 | Status: SHIPPED | OUTPATIENT
Start: 2025-03-19

## 2025-03-19 ASSESSMENT — PAIN SCALES - GENERAL: PAINLEVEL_OUTOF10: NO PAIN (0)

## 2025-03-19 NOTE — TELEPHONE ENCOUNTER
Mjm with patient informing her she has been scheduled at 4pm with a 3:45 arrival today to see Genesis Falk. Advised to call the office back if unable to make it.

## 2025-03-19 NOTE — PROGRESS NOTES
Assessment & Plan     (J06.9) Acute URI  (primary encounter diagnosis)  Comment: oxygen sats normal, does not appear in distress, was able to eat lunch today without issues  Plan: Influenza A/B, RSV and SARS-CoV2 PCR (COVID-19)Nose        Will run multiplex. Will notify patient of the results when available and intervene accordingly.     Sinus pressure and congestion times 6 days, worsening. Will treat with doxycycline times 7 days.     Symptomatic cares also encouraged. The patient will follow up with new or worsening symptoms.     (I10) Essential hypertension  Comment: BP high; has not been taking her lisinopril  Plan: lisinopril (ZESTRIL) 10 MG tablet        Will restart her lisinopril and she will reschedule f/up for CDM.     (E11.65) Uncontrolled type 2 diabetes mellitus with hyperglycemia (H)  Comment: no confusion; non-complaint with medications  Plan: She will make f/up to discuss diabetes.       The longitudinal plan of care for the diagnosis(es)/condition(s) as documented were addressed during this visit. Due to the added complexity in care, I will continue to support Lakeisha in the subsequent management and with ongoing continuity of care.    Subjective   Lakeisha is a 60 year old, presenting for the following health issues:  Sinus Problem and cough     HPI      Acute Illness  Acute illness concerns: patient with mild dry cough, sinus pressure, fatigue, and body aches   Onset/Duration: 6 days  Symptoms:  Fever: has felt warm; does not have thermometer   Chills/Sweats: YES  Headache (location?): YES  Sinus Pressure: YES  Conjunctivitis:  No  Ear Pain: no  Rhinorrhea: YES  Congestion: YES  Sore Throat: No  Cough: no  Wheeze: No  Decreased Appetite: YES  Nausea: YES  Vomiting: YES - improved, was able to eat lunch today without nausea or stomach cramps  Diarrhea: No  Dysuria/Freq.: No  Dysuria or Hematuria: No  Fatigue/Achiness: YES  Sick/Strep Exposure: No  Therapies tried and outcome: None  She does not  "smoke.   No h/o asthma or COPD.   No confusion.     She does have uncontrolled DM, taking insulin. Non-complaint with follow-up. A1c was 12.9 on 1/22/24.       Review of Systems  Constitutional, HEENT, cardiovascular, pulmonary, gi and gu systems are negative, except as otherwise noted.      Objective    BP (!) 140/90 (BP Location: Left arm, Patient Position: Sitting, Cuff Size: Adult Regular)   Pulse 91   Temp 97.3  F (36.3  C) (Tympanic)   Ht 1.702 m (5' 7\")   Wt 87.5 kg (193 lb)   SpO2 98%   BMI 30.23 kg/m    Body mass index is 30.23 kg/m .  Physical Exam   GENERAL: alert and no distress  EYES: Eyes grossly normal to inspection, PERRL and conjunctivae and sclerae normal  HENT: ear canals and TM's normal, nose and mouth without ulcers or lesions  NECK: no adenopathy, no asymmetry, masses, or scars  RESP: lungs clear to auscultation - no rales, rhonchi or wheezes  CV: regular rate and rhythm, normal S1 S2, no S3 or S4, no murmur, click or rub, no peripheral edema  ABDOMEN: soft, nontender, no hepatosplenomegaly, no masses and bowel sounds normal  MS: no gross musculoskeletal defects noted, no edema  NEURO: Normal strength and tone, mentation intact and speech normal  PSYCH: mentation appears normal, affect normal/bright    Multiplex in process        Signed Electronically by: Genesis Falk NP    "

## 2025-03-19 NOTE — TELEPHONE ENCOUNTER
8:03 AM    Reason for Call: OVERBOOK    Patient is having the following symptoms: Flu symptoms, sinus infection for 6 days.    The patient is requesting an appointment for exam with RAJAT Falk.    Was an appointment offered for this call? Yes  If yes : Appointment type: 12:45 with another provider 03/19 patient didn't want              Date    Preferred method for responding to this message: Telephone Call  What is your phone number ?  [phone]     If we cannot reach you directly, may we leave a detailed response at the number you provided? Yes    Can this message wait until your PCP/provider returns, if unavailable today? Not applicable    Andria Gibbons

## 2025-03-19 NOTE — LETTER
March 19, 2025      Lakeisha Herman  [address]   [place] [address]        To Whom It May Concern:    Lakeisha Herman was seen on 3/19/2025.  Please excuse her from work today, 3/19/2025, and tomorrow, 3/20/25.           Sincerely,        Genesis Falk NP    Electronically signed

## 2025-03-20 RX ORDER — PEN NEEDLE, DIABETIC 32GX 5/32"
4 NEEDLE, DISPOSABLE MISCELLANEOUS DAILY
Qty: 100 EACH | Refills: 3 | Status: SHIPPED | OUTPATIENT
Start: 2025-03-20

## 2025-04-09 PROBLEM — M86.9 OSTEOMYELITIS OF GREAT TOE OF LEFT FOOT (H): Status: RESOLVED | Noted: 2024-01-23 | Resolved: 2025-04-09

## 2025-04-09 NOTE — PROGRESS NOTES
Assessment & Plan     (E11.65) Uncontrolled type 2 diabetes mellitus with hyperglycemia (H)  (primary encounter diagnosis)  Plan: Hemoglobin A1c, Comprehensive metabolic panel         (BMP + Alb, Alk Phos, ALT, AST, Total. Bili,         TP), Lipid Profile (Chol, Trig, HDL, LDL calc),        Albumin Random Urine Quantitative with Creat         Ratio, metFORMIN (GLUCOPHAGE) 1000 MG tablet        A1C in process. Will notify patient of the results when available and intervene accordingly. BP at goal. Eye exam UTD. Will restart statin. Will reassess in 3 months.     (I10) Essential hypertension  Plan: Comprehensive metabolic panel (BMP + Alb, Alk  Phos, ALT, AST, Total. Bili, TP), lisinopril (ZESTRIL) 10 MG tablet        Well controlled. Continue current medications. Encouraged daily exercise and a low sodium diet. Recommended checking BP's 2x/wk, call the clinic if consistantly s>140 or d>90. Follow up in 6 months.       (E78.5) Hyperlipidemia, unspecified hyperlipidemia type  Comment: not taking statin  Plan: Comprehensive metabolic panel (BMP + Alb, Alk         Phos, ALT, AST, Total. Bili, TP), Lipid Profile        (Chol, Trig, HDL, LDL calc)        Will update labs and restart statin. Will notify patient of the results when available and intervene accordingly.     (R05.1) Acute cough  Comment: improved with Zyrtec  Plan: cetirizine (ZYRTEC) 10 MG tablet        C/W current medications.         The longitudinal plan of care for the diagnosis(es)/condition(s) as documented were addressed during this visit. Due to the added complexity in care, I will continue to support Lakeisha in the subsequent management and with ongoing continuity of care.    Sherrie Suazo is a 60 year old, presenting for the following health issues:  Diabetes, Lipids, and Hypertension    HPI      Diabetes Follow-up    How often are you checking your blood sugar? Two times daily  Blood sugar testing frequency justification:  Uncontrolled  "diabetes  What time of day are you checking your blood sugars (select all that apply)?  Before meals  Have you had any blood sugars above 200?  Yes - RARE  Have you had any blood sugars below 70?  No  What symptoms do you notice when your blood sugar is low?  Shaky, Dizzy, and Weak  What concerns do you have today about your diabetes? Blood sugar is often over 200   Do you have any of these symptoms? (Select all that apply)  Numbness in feet and Weight gain  Taking Metformin 1000 mg BID with long acting insulin with sliding scale Novolog without side effects.   Denies chest pain, shortness of breath, dizziness, syncope, or palpitations.      Hyperlipidemia Follow-Up    Are you regularly taking any medication or supplement to lower your cholesterol?   No, unsure why  Are you having muscle aches or other side effects that you think could be caused by your cholesterol lowering medication?  N/A    Hypertension Follow-up    Do you check your blood pressure regularly outside of the clinic? No   Are you following a low salt diet? Yes  Are your blood pressures ever more than 140 on the top number (systolic) OR more   than 90 on the bottom number (diastolic), for example 140/90? N/A  Taking lisinopril 10 mg without side effects.     Seasonal allergies controlled with Zyrtec. Would like to continue.     Due for a CP with pap. Declines.     Due for a mammogram. Agreeable.    BP Readings from Last 2 Encounters:   04/10/25 124/80   03/19/25 (!) 140/90     Hemoglobin A1C (%)   Date Value   05/24/2023 12.3 (H)   12/16/2020 12.1 (H)     LDL Cholesterol Calculated (mg/dL)   Date Value   03/05/2024 102 (H)   05/24/2023 80   01/13/2021 96   12/16/2020 85           Review of Systems  Constitutional, HEENT, cardiovascular, pulmonary, gi and gu systems are negative, except as otherwise noted.      Objective    /80   Pulse 89   Temp 97.3  F (36.3  C) (Tympanic)   Resp 16   Ht 1.702 m (5' 7\")   Wt 87.4 kg (192 lb 11.2 oz)   " SpO2 97%   BMI 30.18 kg/m    Body mass index is 30.18 kg/m .  Physical Exam   GENERAL: alert and no distress  EYES: Eyes grossly normal to inspection, PERRL and conjunctivae and sclerae normal  HENT: ear canals and TM's normal, nose and mouth without ulcers or lesions  NECK: no adenopathy, no asymmetry, masses, or scars  RESP: lungs clear to auscultation - no rales, rhonchi or wheezes  CV: regular rate and rhythm, normal S1 S2, no S3 or S4, no murmur, click or rub, no peripheral edema  ABDOMEN: soft, nontender, no hepatosplenomegaly, no masses and bowel sounds normal  MS: no gross musculoskeletal defects noted, no edema  NEURO: Normal strength and tone, mentation intact and speech normal  PSYCH: mentation appears normal, affect normal/bright  Diabetic foot exam: normal DP and PT pulses, no trophic changes or ulcerative lesions, and normal sensory exam; first 4 toes removed on left foot    Labs in process        Signed Electronically by: Genesis Falk NP

## 2025-04-10 ENCOUNTER — OFFICE VISIT (OUTPATIENT)
Dept: FAMILY MEDICINE | Facility: OTHER | Age: 60
End: 2025-04-10
Attending: NURSE PRACTITIONER
Payer: COMMERCIAL

## 2025-04-10 VITALS
HEIGHT: 67 IN | BODY MASS INDEX: 30.25 KG/M2 | SYSTOLIC BLOOD PRESSURE: 124 MMHG | RESPIRATION RATE: 16 BRPM | WEIGHT: 192.7 LBS | OXYGEN SATURATION: 97 % | HEART RATE: 89 BPM | TEMPERATURE: 97.3 F | DIASTOLIC BLOOD PRESSURE: 80 MMHG

## 2025-04-10 DIAGNOSIS — Z12.31 ENCOUNTER FOR SCREENING MAMMOGRAM FOR BREAST CANCER: ICD-10-CM

## 2025-04-10 DIAGNOSIS — R05.1 ACUTE COUGH: ICD-10-CM

## 2025-04-10 DIAGNOSIS — E11.65 UNCONTROLLED TYPE 2 DIABETES MELLITUS WITH HYPERGLYCEMIA (H): Primary | ICD-10-CM

## 2025-04-10 DIAGNOSIS — E78.5 HYPERLIPIDEMIA, UNSPECIFIED HYPERLIPIDEMIA TYPE: ICD-10-CM

## 2025-04-10 DIAGNOSIS — I10 ESSENTIAL HYPERTENSION: ICD-10-CM

## 2025-04-10 LAB
ALBUMIN SERPL BCG-MCNC: 4.2 G/DL (ref 3.5–5.2)
ALP SERPL-CCNC: 121 U/L (ref 40–150)
ALT SERPL W P-5'-P-CCNC: 23 U/L (ref 0–50)
ANION GAP SERPL CALCULATED.3IONS-SCNC: 8 MMOL/L (ref 7–15)
AST SERPL W P-5'-P-CCNC: 22 U/L (ref 0–45)
BILIRUB SERPL-MCNC: 0.3 MG/DL
BUN SERPL-MCNC: 12.2 MG/DL (ref 8–23)
CALCIUM SERPL-MCNC: 9.4 MG/DL (ref 8.8–10.4)
CHLORIDE SERPL-SCNC: 100 MMOL/L (ref 98–107)
CHOLEST SERPL-MCNC: 205 MG/DL
CREAT SERPL-MCNC: 0.61 MG/DL (ref 0.51–0.95)
CREAT UR-MCNC: 185.9 MG/DL
EGFRCR SERPLBLD CKD-EPI 2021: >90 ML/MIN/1.73M2
EST. AVERAGE GLUCOSE BLD GHB EST-MCNC: 321 MG/DL
FASTING STATUS PATIENT QL REPORTED: NO
FASTING STATUS PATIENT QL REPORTED: NO
GLUCOSE SERPL-MCNC: 225 MG/DL (ref 70–99)
HBA1C MFR BLD: 12.8 %
HCO3 SERPL-SCNC: 31 MMOL/L (ref 22–29)
HDLC SERPL-MCNC: 84 MG/DL
LDLC SERPL CALC-MCNC: 102 MG/DL
MICROALBUMIN UR-MCNC: 53.7 MG/L
MICROALBUMIN/CREAT UR: 28.89 MG/G CR (ref 0–25)
NONHDLC SERPL-MCNC: 121 MG/DL
POTASSIUM SERPL-SCNC: 4.1 MMOL/L (ref 3.4–5.3)
PROT SERPL-MCNC: 7.4 G/DL (ref 6.4–8.3)
SODIUM SERPL-SCNC: 139 MMOL/L (ref 135–145)
TRIGL SERPL-MCNC: 93 MG/DL

## 2025-04-10 RX ORDER — CETIRIZINE HYDROCHLORIDE 10 MG/1
10 TABLET ORAL DAILY
Qty: 90 TABLET | Refills: 0 | Status: SHIPPED | OUTPATIENT
Start: 2025-04-10

## 2025-04-10 RX ORDER — INSULIN ASPART 100 [IU]/ML
1-16 INJECTION, SOLUTION INTRAVENOUS; SUBCUTANEOUS
Qty: 15 ML | Refills: 3 | Status: SHIPPED | OUTPATIENT
Start: 2025-04-10

## 2025-04-10 RX ORDER — ACYCLOVIR 400 MG/1
TABLET ORAL
Qty: 3 EACH | Refills: 5 | Status: SHIPPED | OUTPATIENT
Start: 2025-04-10

## 2025-04-10 RX ORDER — INSULIN GLARGINE-YFGN 100 [IU]/ML
18 INJECTION, SOLUTION SUBCUTANEOUS AT BEDTIME
Qty: 15 ML | Refills: 3 | Status: SHIPPED | OUTPATIENT
Start: 2025-04-10

## 2025-04-10 RX ORDER — LISINOPRIL 10 MG/1
10 TABLET ORAL DAILY
Qty: 90 TABLET | Refills: 1 | Status: SHIPPED | OUTPATIENT
Start: 2025-04-10

## 2025-04-10 RX ORDER — ROSUVASTATIN CALCIUM 10 MG/1
10 TABLET, COATED ORAL DAILY
Qty: 90 TABLET | Refills: 0 | Status: SHIPPED | OUTPATIENT
Start: 2025-04-10

## 2025-04-10 ASSESSMENT — PAIN SCALES - GENERAL: PAINLEVEL_OUTOF10: MILD PAIN (3)

## 2025-04-17 ENCOUNTER — TELEPHONE (OUTPATIENT)
Dept: FAMILY MEDICINE | Facility: OTHER | Age: 60
End: 2025-04-17

## 2025-04-17 DIAGNOSIS — E11.65 UNCONTROLLED TYPE 2 DIABETES MELLITUS WITH HYPERGLYCEMIA (H): Primary | ICD-10-CM

## 2025-04-17 NOTE — TELEPHONE ENCOUNTER
Pended diabetes referral. Patient has not returned our call I have sent a letter to her regarding results.

## 2025-04-17 NOTE — TELEPHONE ENCOUNTER
----- Message from Genesis Falk sent at 4/10/2025 12:34 PM CDT -----  Notify pt. A1c is 12.8. Needs to see the diabetic center. Please pend referral.     Liver and kidney function stable.     Lipids elevated. Needs to restart statin - high risk for stroke/heart attack.

## 2025-05-23 ENCOUNTER — TRANSFERRED RECORDS (OUTPATIENT)
Dept: HEALTH INFORMATION MANAGEMENT | Facility: CLINIC | Age: 60
End: 2025-05-23

## 2025-06-23 DIAGNOSIS — E11.65 UNCONTROLLED TYPE 2 DIABETES MELLITUS WITH HYPERGLYCEMIA (H): Primary | ICD-10-CM

## 2025-06-24 RX ORDER — ACYCLOVIR 400 MG/1
TABLET ORAL
Qty: 3 EACH | Refills: 5 | Status: SHIPPED | OUTPATIENT
Start: 2025-06-24

## 2025-06-24 NOTE — TELEPHONE ENCOUNTER
Dexcom G7      Last Written Prescription Date:  04/10/25  Last Fill Quantity: 3,   # refills: 5  Last Office Visit: 04/10/25  Future Office visit:    Next 5 appointments (look out 90 days)      Jul 14, 2025 9:00 AM  (Arrive by 8:45 AM)  Provider Visit with Genesis Falk NP  [facility] [place] ([facility] [place] ) [address]  [place] [address]  [phone]             Routing refill request to provider for review/approval because:  Diabetic Supplies Protocol Failed    Rerun Protocol (6/23/2025 5:26 PM)    Medication indicated for associated diagnosis

## 2025-07-05 ENCOUNTER — HOSPITAL ENCOUNTER (EMERGENCY)
Facility: HOSPITAL | Age: 60
Discharge: HOME | End: 2025-07-05
Attending: FAMILY MEDICINE | Admitting: FAMILY MEDICINE
Payer: COMMERCIAL

## 2025-07-05 VITALS
TEMPERATURE: 98.4 F | OXYGEN SATURATION: 96 % | RESPIRATION RATE: 18 BRPM | DIASTOLIC BLOOD PRESSURE: 71 MMHG | SYSTOLIC BLOOD PRESSURE: 111 MMHG | HEART RATE: 96 BPM

## 2025-07-05 DIAGNOSIS — J01.10 ACUTE NON-RECURRENT FRONTAL SINUSITIS: ICD-10-CM

## 2025-07-05 PROCEDURE — G0463 HOSPITAL OUTPT CLINIC VISIT: HCPCS | Performed by: FAMILY MEDICINE

## 2025-07-05 PROCEDURE — 99213 OFFICE O/P EST LOW 20 MIN: CPT | Performed by: FAMILY MEDICINE

## 2025-07-05 RX ORDER — CEFUROXIME AXETIL 500 MG/1
500 TABLET ORAL 2 TIMES DAILY
Qty: 14 TABLET | Refills: 0 | Status: SHIPPED | OUTPATIENT
Start: 2025-07-05

## 2025-07-05 ASSESSMENT — ENCOUNTER SYMPTOMS
SHORTNESS OF BREATH: 0
ACTIVITY CHANGE: 0
HEMATURIA: 0
CHILLS: 0
FATIGUE: 0
ARTHRALGIAS: 0
APPETITE CHANGE: 0
MYALGIAS: 0
HEADACHES: 0
FEVER: 0
DIARRHEA: 0
SORE THROAT: 0
VOMITING: 0
EYE REDNESS: 0
RHINORRHEA: 0
DIZZINESS: 0
DYSURIA: 0
NAUSEA: 0
NECK STIFFNESS: 0
ABDOMINAL PAIN: 0
COUGH: 0

## 2025-07-05 ASSESSMENT — COLUMBIA-SUICIDE SEVERITY RATING SCALE - C-SSRS
6. HAVE YOU EVER DONE ANYTHING, STARTED TO DO ANYTHING, OR PREPARED TO DO ANYTHING TO END YOUR LIFE?: NO
1. IN THE PAST MONTH, HAVE YOU WISHED YOU WERE DEAD OR WISHED YOU COULD GO TO SLEEP AND NOT WAKE UP?: NO
2. HAVE YOU ACTUALLY HAD ANY THOUGHTS OF KILLING YOURSELF IN THE PAST MONTH?: NO

## 2025-07-05 NOTE — DISCHARGE INSTRUCTIONS
Take medications as prescribed, you may also benefit from use of acetaminophen and/or ibuprofen as well as over-the-counter Sudafed for decongestant.  Return here or see primary care if not improving or if worsening.

## 2025-07-05 NOTE — ED PROVIDER NOTES
"  History     Chief Complaint   Patient presents with    Sinusitis     HPI  Lakeisha Herman is a 60 year old female who comes in with complaint of \"I think of got a sinus infection\".  Patient states she started having URI symptoms about 9 to 10 days ago.  Had some low-grade fever nasal congestion nonproductive cough and feeling rundown.  Patient states fever and other symptoms have gone away but she continues to have bifrontal sinus pressure and headache which is severe and feels congested.  She is tried over-the-counter medicines without significant relief.    Past history is notable for type 2 diabetes, history of osteomyelitis with amputation of her left great toe, hypertension    Allergies:  Allergies   Allergen Reactions    Pcn [Penicillins] Rash       Problem List:    Patient Active Problem List    Diagnosis Date Noted    Amputation of left great toe - d/t osteomylitis 03/04/2024     Priority: Medium    Uncontrolled type 2 diabetes mellitus with hyperglycemia (H) 03/04/2024     Priority: Medium    Diabetic foot infection (H) 01/23/2024     Priority: Medium    Hyperlipidemia, unspecified hyperlipidemia type 07/13/2023     Priority: Medium    Left knee pain 03/09/2016     Priority: Medium    Obesity 03/13/2014     Priority: Medium     Overview:   Updated per 10/1/17 IMO import      Fibroids 09/02/2013     Priority: Medium    Essential hypertension 10/23/2007     Priority: Medium    Allergic rhinitis 09/05/2007     Priority: Medium        Past Medical History:    Past Medical History:   Diagnosis Date    Diabetes (H)        Past Surgical History:    Past Surgical History:   Procedure Laterality Date    cyst removed      neck    XR WRIST SURGERY JACY LEFT      unsure specifics       Family History:    Family History   Problem Relation Age of Onset    Hypoglycemia Mother     Cerebrovascular Disease Maternal Grandmother     Dementia Maternal Grandmother        Social History:  Marital Status:   [4]  Social " History     Tobacco Use    Smoking status: Never    Smokeless tobacco: Never   Vaping Use    Vaping status: Never Used   Substance Use Topics    Alcohol use: Never    Drug use: Never        Medications:    aspirin (ASA) 81 MG EC tablet  blood glucose (ACCU-CHEK GUIDE) test strip  blood glucose monitoring (SOFTCLIX) lancets  cefuroxime (CEFTIN) 500 MG tablet  cetirizine (ZYRTEC) 10 MG tablet  Continuous Glucose Sensor (DEXCOM G7 SENSOR) MISC  Insulin Glargine-yfgn 100 UNIT/ML SOPN  insulin pen needle (BD PEN NEEDLE CHAMP 2ND GEN) 32G X 4 MM miscellaneous  lisinopril (ZESTRIL) 10 MG tablet  metFORMIN (GLUCOPHAGE) 1000 MG tablet  NOVOLOG FLEXPEN 100 UNIT/ML soln  rosuvastatin (CRESTOR) 10 MG tablet          Review of Systems   Constitutional:  Negative for activity change, appetite change, chills, fatigue and fever.   HENT:  Negative for congestion, rhinorrhea and sore throat.    Eyes:  Negative for redness.   Respiratory:  Negative for cough and shortness of breath.    Cardiovascular:  Negative for chest pain.   Gastrointestinal:  Negative for abdominal pain, diarrhea, nausea and vomiting.   Genitourinary:  Negative for dysuria and hematuria.   Musculoskeletal:  Negative for arthralgias, myalgias and neck stiffness.   Skin:  Negative for rash.   Neurological:  Negative for dizziness and headaches.       Physical Exam   BP: 111/71  Pulse: 96  Temp: 98.4  F (36.9  C)  Resp: 18  SpO2: 96 %      Physical Exam  Constitutional:       General: She is not in acute distress.     Appearance: Normal appearance. She is not diaphoretic.   HENT:      Head: Atraumatic.      Right Ear: Tympanic membrane and ear canal normal.      Left Ear: Tympanic membrane and ear canal normal.      Nose:      Comments: Pain with percussion over both frontal and maxillary sinuses     Mouth/Throat:      Mouth: Mucous membranes are moist.   Eyes:      General: No scleral icterus.     Conjunctiva/sclera: Conjunctivae normal.   Cardiovascular:      Rate  and Rhythm: Normal rate.      Heart sounds: Normal heart sounds.   Pulmonary:      Effort: No respiratory distress.      Breath sounds: Normal breath sounds.   Musculoskeletal:      Cervical back: Neck supple.   Skin:     General: Skin is warm.      Findings: No rash.   Neurological:      Mental Status: She is alert.         ED Course        Procedures       No results found for this or any previous visit (from the past 24 hours).    Medications - No data to display    Assessments & Plan (with Medical Decision Making)     I have reviewed the nursing notes.      Discharge Medication List as of 7/5/2025  4:00 PM        START taking these medications    Details   cefuroxime (CEFTIN) 500 MG tablet Take 1 tablet (500 mg) by mouth 2 times daily. Take for 7 days, Disp-14 tablet, R-0, E-Prescribe             Final diagnoses:   Acute non-recurrent frontal sinusitis       7/5/2025   [facility]       Freedom Rivera MD  07/05/25 0632

## 2025-07-05 NOTE — ED TRIAGE NOTES
RN assessed patient in triage and determined patient Urgent Care appropriate. Will be seen in Urgent Care.    Patient presents with complaints of a sinus infection she'd had for the last week and just can't kick.   Triage Assessment (Adult)       Row Name 07/05/25 1443          Triage Assessment    Airway WDL WDL        Peripheral/Neurovascular WDL    Peripheral Neurovascular WDL WDL        Cognitive/Neuro/Behavioral WDL    Cognitive/Neuro/Behavioral WDL WDL

## 2025-07-12 ENCOUNTER — APPOINTMENT (OUTPATIENT)
Dept: GENERAL RADIOLOGY | Facility: HOSPITAL | Age: 60
End: 2025-07-12
Attending: EMERGENCY MEDICINE
Payer: COMMERCIAL

## 2025-07-12 ENCOUNTER — HOSPITAL ENCOUNTER (EMERGENCY)
Facility: HOSPITAL | Age: 60
Discharge: HOME | End: 2025-07-12
Attending: NURSE PRACTITIONER
Payer: COMMERCIAL

## 2025-07-12 ENCOUNTER — APPOINTMENT (OUTPATIENT)
Dept: GENERAL RADIOLOGY | Facility: HOSPITAL | Age: 60
End: 2025-07-12
Attending: NURSE PRACTITIONER
Payer: COMMERCIAL

## 2025-07-12 VITALS
SYSTOLIC BLOOD PRESSURE: 160 MMHG | HEART RATE: 100 BPM | DIASTOLIC BLOOD PRESSURE: 92 MMHG | RESPIRATION RATE: 18 BRPM | OXYGEN SATURATION: 98 % | TEMPERATURE: 97.8 F

## 2025-07-12 DIAGNOSIS — S82.402A: ICD-10-CM

## 2025-07-12 DIAGNOSIS — S93.05XA DISLOCATION, ANKLE, LEFT, INITIAL ENCOUNTER: ICD-10-CM

## 2025-07-12 DIAGNOSIS — S80.812A ABRASION OF LEFT CALF, INITIAL ENCOUNTER: ICD-10-CM

## 2025-07-12 PROCEDURE — 27788 TREATMENT OF ANKLE FRACTURE: CPT | Mod: LT

## 2025-07-12 PROCEDURE — 99152 MOD SED SAME PHYS/QHP 5/>YRS: CPT

## 2025-07-12 PROCEDURE — 73590 X-RAY EXAM OF LOWER LEG: CPT | Mod: LT

## 2025-07-12 PROCEDURE — 250N000013 HC RX MED GY IP 250 OP 250 PS 637: Performed by: NURSE PRACTITIONER

## 2025-07-12 PROCEDURE — 99152 MOD SED SAME PHYS/QHP 5/>YRS: CPT | Performed by: NURSE PRACTITIONER

## 2025-07-12 PROCEDURE — 73610 X-RAY EXAM OF ANKLE: CPT | Mod: 26 | Performed by: RADIOLOGY

## 2025-07-12 PROCEDURE — 73590 X-RAY EXAM OF LOWER LEG: CPT | Mod: 26 | Performed by: RADIOLOGY

## 2025-07-12 PROCEDURE — 99153 MOD SED SAME PHYS/QHP EA: CPT | Performed by: NURSE PRACTITIONER

## 2025-07-12 PROCEDURE — 28570 TREAT FOOT DISLOCATION: CPT | Mod: 54 | Performed by: NURSE PRACTITIONER

## 2025-07-12 PROCEDURE — 28570 TREAT FOOT DISLOCATION: CPT | Performed by: NURSE PRACTITIONER

## 2025-07-12 PROCEDURE — 999N000065 XR ANKLE PORT LEFT G/E 3 VIEWS: Mod: LT

## 2025-07-12 PROCEDURE — 99285 EMERGENCY DEPT VISIT HI MDM: CPT | Mod: 25

## 2025-07-12 PROCEDURE — 99283 EMERGENCY DEPT VISIT LOW MDM: CPT | Mod: FS | Performed by: EMERGENCY MEDICINE

## 2025-07-12 PROCEDURE — 27788 TREATMENT OF ANKLE FRACTURE: CPT | Mod: 54 | Performed by: NURSE PRACTITIONER

## 2025-07-12 PROCEDURE — 258N000003 HC RX IP 258 OP 636: Performed by: NURSE PRACTITIONER

## 2025-07-12 PROCEDURE — 999N000157 HC STATISTIC RCP TIME EA 10 MIN

## 2025-07-12 PROCEDURE — 250N000011 HC RX IP 250 OP 636: Performed by: NURSE PRACTITIONER

## 2025-07-12 PROCEDURE — 73610 X-RAY EXAM OF ANKLE: CPT | Mod: LT

## 2025-07-12 PROCEDURE — 99153 MOD SED SAME PHYS/QHP EA: CPT

## 2025-07-12 RX ORDER — SODIUM CHLORIDE 9 MG/ML
INJECTION, SOLUTION INTRAVENOUS CONTINUOUS
Status: DISCONTINUED | OUTPATIENT
Start: 2025-07-12 | End: 2025-07-12 | Stop reason: HOSPADM

## 2025-07-12 RX ORDER — PROPOFOL 10 MG/ML
60 INJECTION, EMULSION INTRAVENOUS ONCE
Status: COMPLETED | OUTPATIENT
Start: 2025-07-12 | End: 2025-07-12

## 2025-07-12 RX ORDER — OXYCODONE HYDROCHLORIDE 5 MG/1
2.5-5 TABLET ORAL EVERY 6 HOURS PRN
Qty: 10 TABLET | Refills: 0 | Status: SHIPPED | OUTPATIENT
Start: 2025-07-12

## 2025-07-12 RX ORDER — PROPOFOL 10 MG/ML
60 INJECTION, EMULSION INTRAVENOUS
Status: COMPLETED | OUTPATIENT
Start: 2025-07-12 | End: 2025-07-12

## 2025-07-12 RX ORDER — OXYCODONE HYDROCHLORIDE 5 MG/1
5 TABLET ORAL ONCE
Refills: 0 | Status: COMPLETED | OUTPATIENT
Start: 2025-07-12 | End: 2025-07-12

## 2025-07-12 RX ORDER — PROPOFOL 10 MG/ML
30 INJECTION, EMULSION INTRAVENOUS
Status: COMPLETED | OUTPATIENT
Start: 2025-07-12 | End: 2025-07-12

## 2025-07-12 RX ORDER — PROPOFOL 10 MG/ML
60 INJECTION, EMULSION INTRAVENOUS CONTINUOUS
Status: DISCONTINUED | OUTPATIENT
Start: 2025-07-12 | End: 2025-07-12

## 2025-07-12 RX ADMIN — OXYCODONE HYDROCHLORIDE 5 MG: 5 TABLET ORAL at 17:02

## 2025-07-12 RX ADMIN — PROPOFOL 60 MG: 10 INJECTION, EMULSION INTRAVENOUS at 16:19

## 2025-07-12 RX ADMIN — SODIUM CHLORIDE: 9 INJECTION, SOLUTION INTRAVENOUS at 16:13

## 2025-07-12 ASSESSMENT — COLUMBIA-SUICIDE SEVERITY RATING SCALE - C-SSRS
2. HAVE YOU ACTUALLY HAD ANY THOUGHTS OF KILLING YOURSELF IN THE PAST MONTH?: NO
6. HAVE YOU EVER DONE ANYTHING, STARTED TO DO ANYTHING, OR PREPARED TO DO ANYTHING TO END YOUR LIFE?: NO
1. IN THE PAST MONTH, HAVE YOU WISHED YOU WERE DEAD OR WISHED YOU COULD GO TO SLEEP AND NOT WAKE UP?: NO

## 2025-07-12 ASSESSMENT — ENCOUNTER SYMPTOMS
CARDIOVASCULAR NEGATIVE: 1
GASTROINTESTINAL NEGATIVE: 1
PSYCHIATRIC NEGATIVE: 1
NEUROLOGICAL NEGATIVE: 1
ENDOCRINE NEGATIVE: 1
EYES NEGATIVE: 1
ALLERGIC/IMMUNOLOGIC NEGATIVE: 1
HEMATOLOGIC/LYMPHATIC NEGATIVE: 1
RESPIRATORY NEGATIVE: 1
CONSTITUTIONAL NEGATIVE: 1

## 2025-07-12 ASSESSMENT — ACTIVITIES OF DAILY LIVING (ADL)
ADLS_ACUITY_SCORE: 41

## 2025-07-12 NOTE — ED NOTES
Emergency Department Attending Supervision Note    I have personally seen and examined the patient.  Case reviewed and discussed with Vishal EscalonaDelgado.  I have reviewed and agree with the PMH, FH, SOC, ROS.  Please see today's note by REBECCA.  REBECCA care under my supervision.    Key Exam:  General: Well-developed elderly female patient, laying in bed, no acute distress  MSK: Status post left forefoot amputation.  Left ankle is edematous with decreased range of motion and pain with palpation.  Foot is warm and well-perfused.        Key Medical Decision Making/Plan:  Patient is a 60-year-old female who presents with a mechanical fall and a left ankle injury.  X-rays reveal a fracture dislocation of the left ankle with superior subluxation of the tibiotalar joint.  Patient consented for sedation and reduction, see procedure note below. Patient tolerated reduction well. Alignment much improved on post reduction films. Stable for nonweight bearing and orthopedics follow up.        [facility]    Procedure: Sedation    Date/Time: 7/12/2025 4:37 PM    Performed by: Erik Black MD  Authorized by: Erik Black MD    Risks, benefits and alternatives discussed.    ED EVALUATION:      Assessment Time: 7/12/2025 4:00 PM      I have performed an Emergency Department Evaluation including taking a history and physical examination, this evaluation will be documented in the electronic medical record for this ED encounter.      ASA Class: Class 2- mild systemic disease, no acute problems, no functional limitations    Mallampati: Grade 2- soft palate, base of uvula, tonsillar pillars, and portion of posterior pharyngeal wall visible    NPO Status: not NPO, emergent situation    UNIVERSAL PROTOCOL   Site Marked: NA  Prior Images Obtained and Reviewed:  Yes  Required items: Required blood products, implants, devices and special equipment available    Patient identity confirmed:  Arm band and verbally with patient  Patient  was reevaluated immediately before administering moderate or deep sedation or anesthesia  Confirmation Checklist:  Patient's identity using two indicators, correct equipment/implants were available and procedure was appropriate and matched the consent or emergent situation  Time out: Immediately prior to the procedure a time out was called    Universal Protocol: the Joint Commission Universal Protocol was followed      SEDATION  Patient Sedated: Yes    Sedation Type:  Deep  Sedation:  Propofol  Vital signs: Vital signs monitored during sedation      PROCEDURE    Patient Tolerance:  Patient tolerated the procedure well with no immediate complications  Length of time physician/provider present for 1:1 monitoring during sedation: 10          Erik Black MD on 7/12/2025 at 3:23 PM       Erik Black MD  07/12/25 4719

## 2025-07-12 NOTE — Clinical Note
Lakeisha Herman was seen and treated in our emergency department on 7/12/2025.  She may return to work on 07/16/2025.       If you have any questions or concerns, please don't hesitate to call.      Vishal Delgado, MATTIE CNP

## 2025-07-12 NOTE — ED TRIAGE NOTES
Patient fell. Twisted her ankle. 0-1 pain at rest. 10/10 attempting to bear weight- gives out. swelling above left outer ankle approx 4-5 inches above

## 2025-07-12 NOTE — DISCHARGE INSTRUCTIONS
No weightbearing until seen by orthopedics.        Narcotic pain medications (Oxycodone):   The medications prescribed can be quite effective for control of your pain but ARE NOT a safe long-term choice for your symptom control.  This medication is highly addictive and can lead to medication abuse.  It is recommended to try the ibuprofen/acetaminophen regimen (if able) described below before using the narcotic.   Do not drive, operate machinery, or do work that could harm people when under the influence of narcotic pain medications.  It can impair perception, reaction time, motor skills, and attention in ways that make it dangerous to drive, operate machinery, or engage in any activity at home or at work that could harm others or cause professional malpractice.  Just how long such impairments will last for a particular individual taking a particular type and dose is unknown, but it is at least several hours.  Drinking alcohol while taking narcotic pain medications makes impairment much worse, so abstain for alcohol use.    I recommend taking a stool softener such as Colace or Senokot-S which can be purchased over the counter while you are taking narcotics since these medications can be constipating.  You may also try Miralax OTC if needed for constipation.     Pain control:   If your past medical conditions, allergies, current medications, or current status does not prevent you from using acetaminophen and/or ibuprofen, use the following: Take acetaminophen 650-1000 mg every 6 hours as needed for pain in addition to ibuprofen 400 mg every 6 hours as needed for pain.  Take these two medications together.         Splint:   You have been placed in a temporary splint of your extremity. Use caution NOT to get this wet. Observe for signs of splint fitment issues, which can arise with swelling and injury.  These symptoms include numbness, coolness, color change, pain in fingers/toes.  If these develop, loosen the ACE wrap  and follow up immediately.   For pain control, it is advised to use only acetaminophen (Tylenol).  After injury, you can use any of the NSAID's listed below, but only for 1-2 days, then switch to acetaminophen only.  The use of NSAID's such as aspirin, ibuprofen, naproxen, or Aleve reduces inflammation.  Inflammation is needed for bone healing.   Ice the injured area every 3-4 hours for 20 minutes.  Be sure to place a barrier between the ice and skin so frostbite doesn't occur.   Elevate the injured area above the level of your heart when you are not up and about.            Follow-up with your primary care provider for reevaluation.  Contact your primary care provider if you have any questions or concerns.  Do not hesitate to return to the ER if any new or worsening symptoms.     Please read the attached instructions (if any).  They highlight more specific treatments and interventions for you at home.              Thank you for letting me participate in your care and wish you a fast and uneventful recovery,    Vishal PHELPS, CNP    Do not hesitate to contact me with questions or concerns.  [email or web address]

## 2025-07-13 ENCOUNTER — HEALTH MAINTENANCE LETTER (OUTPATIENT)
Age: 60
End: 2025-07-13

## 2025-07-14 DIAGNOSIS — E11.65 TYPE 2 DIABETES MELLITUS WITH HYPERGLYCEMIA (H): ICD-10-CM

## 2025-07-14 NOTE — TELEPHONE ENCOUNTER
Novolog      Last Written Prescription Date:  4/10/25  Last Fill Quantity: 15mL,   # refills: 3  Last Office Visit: 4/10/25  Future Office visit:

## 2025-07-15 ENCOUNTER — TRANSFERRED RECORDS (OUTPATIENT)
Dept: HEALTH INFORMATION MANAGEMENT | Facility: CLINIC | Age: 60
End: 2025-07-15

## 2025-07-15 DIAGNOSIS — E11.65 TYPE 2 DIABETES MELLITUS WITH HYPERGLYCEMIA, WITH LONG-TERM CURRENT USE OF INSULIN (H): ICD-10-CM

## 2025-07-15 DIAGNOSIS — Z79.4 TYPE 2 DIABETES MELLITUS WITH HYPERGLYCEMIA, WITH LONG-TERM CURRENT USE OF INSULIN (H): ICD-10-CM

## 2025-07-15 RX ORDER — INSULIN ASPART 100 [IU]/ML
INJECTION, SOLUTION INTRAVENOUS; SUBCUTANEOUS
Qty: 15 ML | Refills: 0 | Status: SHIPPED | OUTPATIENT
Start: 2025-07-15

## 2025-07-15 RX ORDER — PEN NEEDLE, DIABETIC 32GX 5/32"
NEEDLE, DISPOSABLE MISCELLANEOUS
Qty: 100 EACH | Refills: 3 | Status: SHIPPED | OUTPATIENT
Start: 2025-07-15

## 2025-07-15 NOTE — TELEPHONE ENCOUNTER
Attempt # 1  Outcome: Talked with Patient    Comment: Spoke w/pt to schedule DM follow up - pt stated she is going to need surgery for an unrelated issue and will call after her surgery to schedule the appt. Pt is aware that the next medication may not be filled until she is scheduled.

## 2025-07-15 NOTE — TELEPHONE ENCOUNTER
insulin pen needle (BD PEN NEEDLE CHAMP 2ND GEN) 32G X 4 MM miscellaneous            Last Written Prescription Date:  3/20/25  Last Fill Quantity: 100,   # refills: 3  Last Office Visit: 4/10/25  Future Office visit:       Routing refill request to provider for review/approval because:  Drug not on the [facility], [facility] or [facility] refill protocol or controlled substance

## 2025-07-15 NOTE — TELEPHONE ENCOUNTER
Insulin Protocol Failed    Rerun Protocol (7/14/2025 6:10 AM)    HgbA1C in past 3 or 6 months    If HgbA1C is 8 or greater, it needs to be on file within the past 3 months.  If less than 8, must be on file within the past 6 months.           Recent Labs   Lab Test 04/10/25  1104 01/22/24  1652   A1C 12.8*  --    16283  --  12.9*       Medication is active on med list and the sig matches. RN to manually verify dose and sig if red X/fail.

## 2025-07-16 ENCOUNTER — TELEPHONE (OUTPATIENT)
Dept: FAMILY MEDICINE | Facility: OTHER | Age: 60
End: 2025-07-16

## 2025-07-16 NOTE — TELEPHONE ENCOUNTER
3:53 PM    Reason for Call: OVERBOOK    Patient is preop /[place]? / DOS 7-21 / Repair fibula Left / Dr. Everett     The patient is requesting an appointment for prior to surgery with SHANDA Falk.    Was an appointment offered for this call? No  If yes : Appointment type              Date    Preferred method for responding to this message: Telephone Call  What is your phone number ? [phone]     If we cannot reach you directly, may we leave a detailed response at the number you provided? Yes    Can this message wait until your PCP/provider returns, if unavailable today? Kandi Faustin

## 2025-07-16 NOTE — PROGRESS NOTES
Preoperative Evaluation  [facility] - [place]  [address]  [place] [address]  Phone: [phone]  Primary Provider: Genesis Falk NP  Pre-op Performing Provider: Genesis Falk NP  Jul 17, 2025 7/17/2025   Surgical Information   What procedure is being done? Left Fibula Repair   Facility or Hospital where procedure[facility]surgery [facility] be performed[facility]   Who is doing the procedure / surgery? Dr. Everett   Date of surgery / procedure: July 21, 2025   Time of surgery / procedure: TBI   Where do you plan to recover after surgery? at home with family     Fax number for surgical facility: [phone]    Assessment & Plan     The proposed surgical procedure is considered INTERMEDIATE risk.    (Z01.818) Preop general physical exam  (primary encounter diagnosis)  (S82.402D) Closed fracture of shaft of left fibula with routine healing, unspecified fracture morphology, subsequent encounter  Comment:   Plan: EKG unremarkable. CBC normal. BMP with glucose of 205 - otherwise normal. A1C 9.3. Needs surgery. Will therefore clear. Ok to proceed with surgery.     (E11.65) Uncontrolled type 2 diabetes mellitus with hyperglycemia (H)  Plan: A1c poorly controlled at 9.3. Needs surgery. Will therefore clear. A1C is also improving.     (I10) Essential hypertension  Plan: BP borderline high, but she has not been taking her lisinopril. Restarted 2 days ago. Will continue current dose and reassess in 3 months.     (E78.5) Hyperlipidemia, unspecified hyperlipidemia type  Plan: Not on statin. Concerned about the side effects.         Risks and Recommendations  The patient has the following additional risks and recommendations for perioperative complications:  Diabetes:  - Patient is on insulin therapy; diabetic NPO guidelines provided and discussed.  Pulmonary:    - Incentive spirometry post-op      Will hold all oral medications the morning of surgery.     Will hold all NSAIDs until after surgery.      Will hold all Novolog the morning of surgery.     She takes 18 units of Insulin Glargine. A1C is 9.3. Ok to take full dose of 18 units the night before surgery.     Recommendation  Approval given to proceed with proposed procedure, without further diagnostic evaluation.    Lakeisha Herman with a functional capacity of greater than four MET's. There are no obvious contraindications to proceeding with surgery at this time. Recommend that the surgeon review risks and benefits of the procedure prior to proceeding.     Was recommended to avoid eating and drinking anything 12 hours prior to surgery unless his surgeon tells him otherwise.     The longitudinal plan of care for the diagnosis(es)/condition(s) as documented were addressed during this visit. Due to the added complexity in care, I will continue to support Lakeisha in the subsequent management and with ongoing continuity of care.    Sherrie Suazo is a 60 year old, presenting for the following:  Pre-Op Exam    HPI: Patient with left fibula fracture after fall. Needing repaired.             7/17/2025   Pre-Op Questionnaire   Have you ever had a heart attack or stroke? No   Have you ever had surgery on your heart or blood vessels, such as a stent placement, a coronary artery bypass, or surgery on an artery in your head, neck, heart, or legs? No   Do you have chest pain with activity? No   Do you have a history of heart failure? No   Do you currently have a cold, bronchitis or symptoms of other infection? (!) YES mild cough; did have URI symptoms 2-3 weeks ago and was treated with Ceftin - now feeling better, no fevers   Do you have a cough, shortness of breath, or wheezing? (!) YES - see above   Do you or anyone in your family have previous history of blood clots? No   Do you or does anyone in your family have a serious bleeding problem such as prolonged bleeding following surgeries or cuts? No   Have you ever had problems with anemia or been told to take  iron pills? No   Have you had any abnormal blood loss such as black, tarry or bloody stools, or abnormal vaginal bleeding? No   Have you ever had a blood transfusion? No   Are you willing to have a blood transfusion if it is medically needed before, during, or after your surgery? Yes   Have you or any of your relatives ever had problems with anesthesia? No   Do you have sleep apnea, excessive snoring or daytime drowsiness? No   Do you have any artifical heart valves or other implanted medical devices like a pacemaker, defibrillator, or continuous glucose monitor? No   Do you have artificial joints? No   Are you allergic to latex? No       Advance Care Planning  Patient states has Health Care Directive and will send to [facility].    Preoperative Review of    reviewed - controlled substances reflected in medication list.  \    Status of Chronic Conditions:  DIABETES - Patient has a longstanding history of Diabetes Type II . Patient is being treated with diet, oral agents, and insulin injections and denies significant side effects. Control has been poor. A1C was 12.8 on 4/10/25. Complicating factors include but are not limited to: hypertension and hyperlipidemia.     HYPERLIPIDEMIA - Patient has a long history of significant Hyperlipidemia. Has not been taking her statin - concerned about the side effects.     HYPERTENSION - Patient has longstanding history of HTN , currently denies any symptoms referable to elevated blood pressure. Specifically denies chest pain, palpitations, dyspnea, orthopnea, PND or peripheral edema. Was not taking the lisinopril, but recently restarted this. Current medication regimen is as listed below - lisinopril 10 mg. Patient denies any side effects of medication.     Mets greater than 4, was able to walk a mile without stopping before hurting her foot.     Patient Active Problem List    Diagnosis Date Noted    Amputation of left great toe - d/t osteomylitis 03/04/2024      Priority: Medium    Uncontrolled type 2 diabetes mellitus with hyperglycemia (H) 03/04/2024     Priority: Medium    Diabetic foot infection (H) 01/23/2024     Priority: Medium    Hyperlipidemia, unspecified hyperlipidemia type 07/13/2023     Priority: Medium    Left knee pain 03/09/2016     Priority: Medium    Obesity 03/13/2014     Priority: Medium     Overview:   Updated per 10/1/17 IMO import      Fibroids 09/02/2013     Priority: Medium    Essential hypertension 10/23/2007     Priority: Medium    Allergic rhinitis 09/05/2007     Priority: Medium      Past Medical History:   Diagnosis Date    Diabetes (H)      Past Surgical History:   Procedure Laterality Date    cyst removed      neck    XR WRIST SURGERY JACY LEFT      unsure specifics     Current Outpatient Medications   Medication Sig Dispense Refill    aspirin (ASA) 81 MG EC tablet Take 1 tablet (81 mg) by mouth daily 90 tablet 1    blood glucose (ACCU-CHEK GUIDE) test strip Use to test blood sugar 2 times daily. 100 strip 6    blood glucose monitoring (SOFTCLIX) lancets Use to test blood sugar 2 times daily. 100 each 3    cefuroxime (CEFTIN) 500 MG tablet Take 1 tablet (500 mg) by mouth 2 times daily. Take for 7 days 14 tablet 0    cetirizine (ZYRTEC) 10 MG tablet Take 1 tablet (10 mg) by mouth daily. 90 tablet 0    Continuous Glucose Sensor (DEXCOM G7 SENSOR) MISC CHANGE EVERY 10 DAYS 3 each 5    EMBECTA PEN NEEDLE CHAMP 2 GEN 32G X 4 MM miscellaneous USE  4 TIMES DAILY 100 each 3    Insulin Glargine-yfgn 100 UNIT/ML SOPN Inject 18 Units subcutaneously at bedtime. 15 mL 3    lisinopril (ZESTRIL) 10 MG tablet Take 1 tablet (10 mg) by mouth daily. 90 tablet 1    metFORMIN (GLUCOPHAGE) 1000 MG tablet Take 1 tablet (1,000 mg) by mouth 2 times daily (with meals). 180 tablet 1    NOVOLOG FLEXPEN 100 UNIT/ML soln INJECT 1 TO 16 UNITS SUBCUTANEOUSLY 4 TIMES DAILY WITH MEALS AND NIGHTLY 15 mL 0    oxyCODONE (ROXICODONE) 5 MG tablet Take 0.5-1 tablets (2.5-5 mg) by  "mouth every 6 hours as needed for breakthrough pain. 10 tablet 0    rosuvastatin (CRESTOR) 10 MG tablet Take 1 tablet (10 mg) by mouth daily. (Patient not taking: Reported on 7/17/2025) 90 tablet 0       Allergies   Allergen Reactions    Pcn [Penicillins] Rash        Social History     Tobacco Use    Smoking status: Never    Smokeless tobacco: Never   Substance Use Topics    Alcohol use: Never     Family History   Problem Relation Age of Onset    Hypoglycemia Mother     Cerebrovascular Disease Maternal Grandmother     Dementia Maternal Grandmother      History   Drug Use Unknown             Review of Systems  CONSTITUTIONAL: NEGATIVE for fever, chills, change in weight  INTEGUMENTARY/SKIN: NEGATIVE for worrisome rashes, moles or lesions  EYES: NEGATIVE for vision changes or irritation  ENT/MOUTH: NEGATIVE for ear, mouth and throat problems  RESP: NEGATIVE for significant cough or SOB  BREAST: NEGATIVE for masses, tenderness or discharge  CV: NEGATIVE for chest pain, palpitations or peripheral edema  GI: NEGATIVE for nausea, abdominal pain, heartburn, or change in bowel habits  : NEGATIVE for frequency, dysuria, or hematuria  MUSCULOSKELETAL: left ankle pain  NEURO: NEGATIVE for weakness, dizziness or paresthesias  ENDOCRINE: NEGATIVE for temperature intolerance, skin/hair changes  HEME: NEGATIVE for bleeding problems  PSYCHIATRIC: NEGATIVE for changes in mood or affect    Objective    /78 (BP Location: Left arm, Patient Position: Sitting, Cuff Size: Adult Large)   Pulse 92   Temp 97.7  F (36.5  C) (Tympanic)   Resp 16   Ht 1.702 m (5' 7\")   Wt 81.6 kg (180 lb)   SpO2 100%   BMI 28.19 kg/m     Estimated body mass index is 28.19 kg/m  as calculated from the following:    Height as of this encounter: 1.702 m (5' 7\").    Weight as of this encounter: 81.6 kg (180 lb).  Physical Exam  GENERAL: alert and no distress  EYES: Eyes grossly normal to inspection, PERRL and conjunctivae and sclerae normal  HENT: " ear canals and TM's normal, nose and mouth without ulcers or lesions  NECK: no adenopathy, no asymmetry, masses, or scars  RESP: lungs clear to auscultation - no rales, rhonchi or wheezes  CV: regular rate and rhythm, no murmur, click or rub, no peripheral edema  ABDOMEN: soft, nontender, no hepatosplenomegaly, no masses and bowel sounds normal  MS: no gross musculoskeletal defects noted, no edema  NEURO: Normal strength and tone, mentation intact and speech normal  PSYCH: mentation appears normal, affect normal/bright    Recent Labs   Lab Test 04/10/25  1104      POTASSIUM 4.1   CR 0.61   A1C 12.8*        Diagnostics  Recent Results (from the past 24 hours)   EKG 12-lead complete w/read - (Clinic Performed)    Collection Time: 07/17/25  9:10 AM   Result Value Ref Range    Systolic Blood Pressure  mmHg    Diastolic Blood Pressure  mmHg    Ventricular Rate 102 BPM    Atrial Rate 102 BPM    WA Interval 128 ms    QRS Duration 88 ms     ms    QTc 443 ms    P Axis 46 degrees    R AXIS 57 degrees    T Axis 58 degrees    Interpretation ECG       Sinus tachycardia with occasional Premature ventricular complexes  Cannot rule out Anterior infarct , age undetermined  Abnormal ECG  No previous ECGs available     Hemoglobin A1c    Collection Time: 07/17/25  9:22 AM   Result Value Ref Range    Estimated Average Glucose 220 (H) <117 mg/dL    Hemoglobin A1C 9.3 (H) <5.7 %   Basic metabolic panel    Collection Time: 07/17/25  9:22 AM   Result Value Ref Range    Sodium 139 135 - 145 mmol/L    Potassium 4.4 3.4 - 5.3 mmol/L    Chloride 100 98 - 107 mmol/L    Carbon Dioxide (CO2) 27 22 - 29 mmol/L    Anion Gap 12 7 - 15 mmol/L    Urea Nitrogen 27.2 (H) 8.0 - 23.0 mg/dL    Creatinine 0.68 0.51 - 0.95 mg/dL    GFR Estimate >90 >60 mL/min/1.73m2    Calcium 9.7 8.8 - 10.4 mg/dL    Glucose 205 (H) 70 - 99 mg/dL   CBC with platelets and differential    Collection Time: 07/17/25  9:22 AM   Result Value Ref Range    WBC Count 7.0  4.0 - 11.0 10e3/uL    RBC Count 4.40 3.80 - 5.20 10e6/uL    Hemoglobin 12.9 11.7 - 15.7 g/dL    Hematocrit 38.1 35.0 - 47.0 %    MCV 87 78 - 100 fL    MCH 29.3 26.5 - 33.0 pg    MCHC 33.9 31.5 - 36.5 g/dL    RDW 13.0 10.0 - 15.0 %    Platelet Count 280 150 - 450 10e3/uL    % Neutrophils 72 %    % Lymphocytes 17 %    % Monocytes 5 %    % Eosinophils 4 %    % Basophils 1 %    % Immature Granulocytes 0 %    NRBCs per 100 WBC 0 <1 /100    Absolute Neutrophils 5.0 1.6 - 8.3 10e3/uL    Absolute Lymphocytes 1.2 0.8 - 5.3 10e3/uL    Absolute Monocytes 0.4 0.0 - 1.3 10e3/uL    Absolute Eosinophils 0.3 0.0 - 0.7 10e3/uL    Absolute Basophils 0.1 0.0 - 0.2 10e3/uL    Absolute Immature Granulocytes 0.0 <=0.4 10e3/uL    Absolute NRBCs 0.0 10e3/uL        EKG: sinus tachycardia, , normal axis, normal intervals, no acute ST/T changes c/w ischemia, no LVH by voltage criteria    Revised Cardiac Risk Index (RCRI)  The patient has the following serious cardiovascular risks for perioperative complications:   - Diabetes Mellitus (on Insulin) = 1 point     RCRI Interpretation: 1 point: Class II (low risk - 0.9% complication rate)         Signed Electronically by: Genesis Falk NP  A copy of this evaluation report is provided to the requesting physician.

## 2025-07-17 ENCOUNTER — TELEPHONE (OUTPATIENT)
Dept: CARE COORDINATION | Facility: OTHER | Age: 60
End: 2025-07-17

## 2025-07-17 ENCOUNTER — TELEPHONE (OUTPATIENT)
Dept: FAMILY MEDICINE | Facility: OTHER | Age: 60
End: 2025-07-17

## 2025-07-17 ENCOUNTER — OFFICE VISIT (OUTPATIENT)
Dept: FAMILY MEDICINE | Facility: OTHER | Age: 60
End: 2025-07-17
Attending: NURSE PRACTITIONER
Payer: COMMERCIAL

## 2025-07-17 VITALS
OXYGEN SATURATION: 100 % | SYSTOLIC BLOOD PRESSURE: 136 MMHG | BODY MASS INDEX: 28.25 KG/M2 | HEIGHT: 67 IN | RESPIRATION RATE: 16 BRPM | WEIGHT: 180 LBS | DIASTOLIC BLOOD PRESSURE: 78 MMHG | HEART RATE: 92 BPM | TEMPERATURE: 97.7 F

## 2025-07-17 DIAGNOSIS — E11.65 UNCONTROLLED TYPE 2 DIABETES MELLITUS WITH HYPERGLYCEMIA (H): ICD-10-CM

## 2025-07-17 DIAGNOSIS — S82.402D CLOSED FRACTURE OF SHAFT OF LEFT FIBULA WITH ROUTINE HEALING, UNSPECIFIED FRACTURE MORPHOLOGY, SUBSEQUENT ENCOUNTER: ICD-10-CM

## 2025-07-17 DIAGNOSIS — Z01.818 PREOP GENERAL PHYSICAL EXAM: Primary | ICD-10-CM

## 2025-07-17 DIAGNOSIS — I10 ESSENTIAL HYPERTENSION: ICD-10-CM

## 2025-07-17 DIAGNOSIS — E78.5 HYPERLIPIDEMIA, UNSPECIFIED HYPERLIPIDEMIA TYPE: ICD-10-CM

## 2025-07-17 LAB
ANION GAP SERPL CALCULATED.3IONS-SCNC: 12 MMOL/L (ref 7–15)
ATRIAL RATE - MUSE: 102 BPM
BASOPHILS # BLD AUTO: 0.1 10E3/UL (ref 0–0.2)
BASOPHILS NFR BLD AUTO: 1 %
BUN SERPL-MCNC: 27.2 MG/DL (ref 8–23)
CALCIUM SERPL-MCNC: 9.7 MG/DL (ref 8.8–10.4)
CHLORIDE SERPL-SCNC: 100 MMOL/L (ref 98–107)
CREAT SERPL-MCNC: 0.68 MG/DL (ref 0.51–0.95)
DIASTOLIC BLOOD PRESSURE - MUSE: NORMAL MMHG
EGFRCR SERPLBLD CKD-EPI 2021: >90 ML/MIN/1.73M2
EOSINOPHIL # BLD AUTO: 0.3 10E3/UL (ref 0–0.7)
EOSINOPHIL NFR BLD AUTO: 4 %
ERYTHROCYTE [DISTWIDTH] IN BLOOD BY AUTOMATED COUNT: 13 % (ref 10–15)
EST. AVERAGE GLUCOSE BLD GHB EST-MCNC: 220 MG/DL
GLUCOSE SERPL-MCNC: 205 MG/DL (ref 70–99)
HBA1C MFR BLD: 9.3 %
HCO3 SERPL-SCNC: 27 MMOL/L (ref 22–29)
HCT VFR BLD AUTO: 38.1 % (ref 35–47)
HGB BLD-MCNC: 12.9 G/DL (ref 11.7–15.7)
IMM GRANULOCYTES # BLD: 0 10E3/UL
IMM GRANULOCYTES NFR BLD: 0 %
INTERPRETATION ECG - MUSE: NORMAL
LYMPHOCYTES # BLD AUTO: 1.2 10E3/UL (ref 0.8–5.3)
LYMPHOCYTES NFR BLD AUTO: 17 %
MCH RBC QN AUTO: 29.3 PG (ref 26.5–33)
MCHC RBC AUTO-ENTMCNC: 33.9 G/DL (ref 31.5–36.5)
MCV RBC AUTO: 87 FL (ref 78–100)
MONOCYTES # BLD AUTO: 0.4 10E3/UL (ref 0–1.3)
MONOCYTES NFR BLD AUTO: 5 %
NEUTROPHILS # BLD AUTO: 5 10E3/UL (ref 1.6–8.3)
NEUTROPHILS NFR BLD AUTO: 72 %
NRBC # BLD AUTO: 0 10E3/UL
NRBC BLD AUTO-RTO: 0 /100
P AXIS - MUSE: 46 DEGREES
PLATELET # BLD AUTO: 280 10E3/UL (ref 150–450)
POTASSIUM SERPL-SCNC: 4.4 MMOL/L (ref 3.4–5.3)
PR INTERVAL - MUSE: 128 MS
QRS DURATION - MUSE: 88 MS
QT - MUSE: 340 MS
QTC - MUSE: 443 MS
R AXIS - MUSE: 57 DEGREES
RBC # BLD AUTO: 4.4 10E6/UL (ref 3.8–5.2)
SODIUM SERPL-SCNC: 139 MMOL/L (ref 135–145)
SYSTOLIC BLOOD PRESSURE - MUSE: NORMAL MMHG
T AXIS - MUSE: 58 DEGREES
VENTRICULAR RATE- MUSE: 102 BPM
WBC # BLD AUTO: 7 10E3/UL (ref 4–11)

## 2025-07-17 ASSESSMENT — PAIN SCALES - GENERAL: PAINLEVEL_OUTOF10: MILD PAIN (2)

## 2025-07-17 NOTE — TELEPHONE ENCOUNTER
Spoke with Hayley, surgery coordinator for Dr. Everett in regards to upcoming procedure. Per Hayley, he typically wants A1C to be under 8 to operate. However, with this case, since it is related to an ankle fracture that needs to be fixed, he likely will move forward with it regardless.   Did inform Hayley of last A1C on 4/10/25 was 12.8.   She will inform him of this, but does think he will still do this procedure.

## 2025-07-21 LAB
ATRIAL RATE - MUSE: 102 BPM
DIASTOLIC BLOOD PRESSURE - MUSE: NORMAL MMHG
INTERPRETATION ECG - MUSE: NORMAL
P AXIS - MUSE: 46 DEGREES
PR INTERVAL - MUSE: 128 MS
QRS DURATION - MUSE: 88 MS
QT - MUSE: 340 MS
QTC - MUSE: 443 MS
R AXIS - MUSE: 57 DEGREES
SYSTOLIC BLOOD PRESSURE - MUSE: NORMAL MMHG
T AXIS - MUSE: 58 DEGREES
VENTRICULAR RATE- MUSE: 102 BPM